# Patient Record
Sex: MALE | Race: WHITE | NOT HISPANIC OR LATINO | Employment: FULL TIME | URBAN - METROPOLITAN AREA
[De-identification: names, ages, dates, MRNs, and addresses within clinical notes are randomized per-mention and may not be internally consistent; named-entity substitution may affect disease eponyms.]

---

## 2021-07-13 ENCOUNTER — HISTORICAL (OUTPATIENT)
Dept: ADMINISTRATIVE | Facility: HOSPITAL | Age: 61
End: 2021-07-13

## 2021-12-02 ENCOUNTER — HISTORICAL (OUTPATIENT)
Dept: ADMINISTRATIVE | Facility: HOSPITAL | Age: 61
End: 2021-12-02

## 2021-12-02 LAB
ABS NEUT (OLG): 3.28 X10(3)/MCL (ref 2.1–9.2)
ALBUMIN SERPL-MCNC: 4.3 GM/DL (ref 3.4–4.8)
ALBUMIN/GLOB SERPL: 1.7 RATIO (ref 1.1–2)
ALP SERPL-CCNC: 50 UNIT/L (ref 40–150)
ALT SERPL-CCNC: 30 UNIT/L (ref 0–55)
AST SERPL-CCNC: 18 UNIT/L (ref 5–34)
BASOPHILS # BLD AUTO: 0.1 X10(3)/MCL (ref 0–0.2)
BASOPHILS NFR BLD AUTO: 1 %
BILIRUB SERPL-MCNC: 0.7 MG/DL
BILIRUBIN DIRECT+TOT PNL SERPL-MCNC: 0.2 MG/DL (ref 0–0.5)
BILIRUBIN DIRECT+TOT PNL SERPL-MCNC: 0.5 MG/DL (ref 0–0.8)
BUN SERPL-MCNC: 11.1 MG/DL (ref 8.4–25.7)
CALCIUM SERPL-MCNC: 9.2 MG/DL (ref 8.7–10.5)
CHLORIDE SERPL-SCNC: 105 MMOL/L (ref 98–107)
CHOLEST SERPL-MCNC: 245 MG/DL
CHOLEST/HDLC SERPL: 5 {RATIO} (ref 0–5)
CO2 SERPL-SCNC: 29 MMOL/L (ref 23–31)
CREAT SERPL-MCNC: 1.06 MG/DL (ref 0.73–1.18)
EOSINOPHIL # BLD AUTO: 0.2 X10(3)/MCL (ref 0–0.9)
EOSINOPHIL NFR BLD AUTO: 3 %
ERYTHROCYTE [DISTWIDTH] IN BLOOD BY AUTOMATED COUNT: 13.2 % (ref 11.5–17)
EST. AVERAGE GLUCOSE BLD GHB EST-MCNC: 125.5 MG/DL
GLOBULIN SER-MCNC: 2.5 GM/DL (ref 2.4–3.5)
GLUCOSE SERPL-MCNC: 124 MG/DL (ref 82–115)
HBA1C MFR BLD: 6 %
HCT VFR BLD AUTO: 45.8 % (ref 42–52)
HDLC SERPL-MCNC: 54 MG/DL (ref 35–60)
HGB BLD-MCNC: 15 GM/DL (ref 14–18)
LDLC SERPL CALC-MCNC: 159 MG/DL (ref 50–140)
LYMPHOCYTES # BLD AUTO: 2.1 X10(3)/MCL (ref 0.6–4.6)
LYMPHOCYTES NFR BLD AUTO: 33 %
MCH RBC QN AUTO: 30.5 PG (ref 27–31)
MCHC RBC AUTO-ENTMCNC: 32.8 GM/DL (ref 33–36)
MCV RBC AUTO: 93.1 FL (ref 80–94)
MONOCYTES # BLD AUTO: 0.7 X10(3)/MCL (ref 0.1–1.3)
MONOCYTES NFR BLD AUTO: 11 %
NEUTROPHILS # BLD AUTO: 3.28 X10(3)/MCL (ref 2.1–9.2)
NEUTROPHILS NFR BLD AUTO: 52 %
PLATELET # BLD AUTO: 134 X10(3)/MCL (ref 130–400)
PMV BLD AUTO: 11.9 FL (ref 9.4–12.4)
POTASSIUM SERPL-SCNC: 4.4 MMOL/L (ref 3.5–5.1)
PROT SERPL-MCNC: 6.8 GM/DL (ref 5.8–7.6)
PSA SERPL-MCNC: 1.35 NG/ML
RBC # BLD AUTO: 4.92 X10(6)/MCL (ref 4.7–6.1)
SODIUM SERPL-SCNC: 142 MMOL/L (ref 136–145)
TRIGL SERPL-MCNC: 158 MG/DL (ref 34–140)
TSH SERPL-ACNC: 1.46 UIU/ML (ref 0.35–4.94)
VLDLC SERPL CALC-MCNC: 32 MG/DL
WBC # SPEC AUTO: 6.3 X10(3)/MCL (ref 4.5–11.5)

## 2022-04-09 ENCOUNTER — HISTORICAL (OUTPATIENT)
Dept: ADMINISTRATIVE | Facility: HOSPITAL | Age: 62
End: 2022-04-09

## 2022-04-25 VITALS
SYSTOLIC BLOOD PRESSURE: 136 MMHG | HEIGHT: 67 IN | OXYGEN SATURATION: 98 % | DIASTOLIC BLOOD PRESSURE: 80 MMHG | WEIGHT: 227.06 LBS | BODY MASS INDEX: 35.64 KG/M2

## 2022-08-04 ENCOUNTER — TELEPHONE (OUTPATIENT)
Dept: PRIMARY CARE CLINIC | Facility: CLINIC | Age: 62
End: 2022-08-04

## 2022-08-04 NOTE — TELEPHONE ENCOUNTER
I spoke with  Benito. He is switching PCP closer to his home. Progress note and blood work results faxed to Dr. Robert Damon.

## 2022-08-04 NOTE — TELEPHONE ENCOUNTER
----- Message from Sameer Cabrera sent at 8/4/2022  3:11 PM CDT -----  Regarding: Last office note  Type:  Patient Call    Who Called: patient  Who Left Message for Patient: nurse  Does the patient know what this is regarding?: yes  Would the patient rather a call back or a response via [facility]? na  Best Call Back Number: [phone]  Additional Information:  need last office note faxed over to [phone]

## 2022-08-26 DIAGNOSIS — R50.9 FEVER, UNSPECIFIED FEVER CAUSE: Primary | ICD-10-CM

## 2022-11-29 DIAGNOSIS — Z11.59 ENCOUNTER FOR SCREENING FOR OTHER VIRAL DISEASES: ICD-10-CM

## 2022-11-29 DIAGNOSIS — Z13.6 ENCOUNTER FOR SCREENING FOR CARDIOVASCULAR DISORDERS: ICD-10-CM

## 2022-11-29 DIAGNOSIS — Z00.00 WELLNESS EXAMINATION: Primary | ICD-10-CM

## 2022-11-29 DIAGNOSIS — Z12.5 PROSTATE CANCER SCREENING: ICD-10-CM

## 2023-08-04 DIAGNOSIS — R41.3 MEMORY LOSS: Primary | ICD-10-CM

## 2023-08-17 ENCOUNTER — APPOINTMENT (OUTPATIENT)
Dept: RADIOLOGY | Facility: HOSPITAL | Age: 63
End: 2023-08-17
Attending: PSYCHIATRY & NEUROLOGY
Payer: COMMERCIAL

## 2023-08-17 DIAGNOSIS — R41.3 MEMORY LOSS: ICD-10-CM

## 2023-08-17 PROCEDURE — 70551 MRI BRAIN STEM W/O DYE: CPT | Mod: TC

## 2024-08-23 ENCOUNTER — PATIENT OUTREACH (OUTPATIENT)
Facility: CLINIC | Age: 64
End: 2024-08-23
Payer: COMMERCIAL

## 2025-01-08 ENCOUNTER — HOSPITAL ENCOUNTER (INPATIENT)
Facility: HOSPITAL | Age: 65
LOS: 7 days | Discharge: HOME HEALTH | DRG: 236 | End: 2025-01-15
Attending: THORACIC SURGERY (CARDIOTHORACIC VASCULAR SURGERY) | Admitting: THORACIC SURGERY (CARDIOTHORACIC VASCULAR SURGERY)
Payer: MEDICARE

## 2025-01-08 DIAGNOSIS — I25.10 ATHEROSCLEROSIS OF NATIVE CORONARY ARTERY OF NATIVE HEART WITHOUT ANGINA PECTORIS: Primary | ICD-10-CM

## 2025-01-08 DIAGNOSIS — I25.10 CAD (CORONARY ARTERY DISEASE): ICD-10-CM

## 2025-01-08 DIAGNOSIS — R00.0 TACHYARRHYTHMIA: ICD-10-CM

## 2025-01-08 DIAGNOSIS — I49.9 ABNORMAL HEART RHYTHM: ICD-10-CM

## 2025-01-08 DIAGNOSIS — R00.1 BRADYCARDIA: ICD-10-CM

## 2025-01-08 PROCEDURE — 11000001 HC ACUTE MED/SURG PRIVATE ROOM

## 2025-01-08 PROCEDURE — 21400001 HC TELEMETRY ROOM

## 2025-01-08 RX ORDER — PRAVASTATIN SODIUM 40 MG/1
40 TABLET ORAL NIGHTLY
COMMUNITY

## 2025-01-08 RX ORDER — AMLODIPINE BESYLATE 5 MG/1
5 TABLET ORAL DAILY
Status: ON HOLD | COMMUNITY
End: 2025-01-13 | Stop reason: HOSPADM

## 2025-01-09 ENCOUNTER — ANESTHESIA EVENT (OUTPATIENT)
Dept: SURGERY | Facility: HOSPITAL | Age: 65
DRG: 236 | End: 2025-01-09
Payer: MEDICARE

## 2025-01-09 LAB
ABORH RETYPE: NORMAL
ALBUMIN SERPL-MCNC: 4.3 G/DL (ref 3.4–4.8)
ALBUMIN/GLOB SERPL: 1.5 RATIO (ref 1.1–2)
ALP SERPL-CCNC: 43 UNIT/L (ref 40–150)
ALT SERPL-CCNC: 20 UNIT/L (ref 0–55)
ANION GAP SERPL CALC-SCNC: 9 MEQ/L
APICAL FOUR CHAMBER EJECTION FRACTION: 53 %
APICAL TWO CHAMBER EJECTION FRACTION: 59 %
APTT PPP: 29.2 SECONDS (ref 23.2–33.7)
AST SERPL-CCNC: 18 UNIT/L (ref 5–34)
AV INDEX (PROSTH): 0.54
AV MEAN GRADIENT: 9 MMHG
AV PEAK GRADIENT: 14.4 MMHG
AV VALVE AREA BY VELOCITY RATIO: 1.7 CM²
AV VALVE AREA: 1.7 CM²
AV VELOCITY RATIO: 0.53
BACTERIA #/AREA URNS AUTO: NORMAL /HPF
BASOPHILS # BLD AUTO: 0.05 X10(3)/MCL
BASOPHILS NFR BLD AUTO: 0.7 %
BILIRUB SERPL-MCNC: 0.7 MG/DL
BILIRUB UR QL STRIP.AUTO: NEGATIVE
BSA FOR ECHO PROCEDURE: 2.12 M2
BUN SERPL-MCNC: 11.6 MG/DL (ref 8.4–25.7)
CALCIUM SERPL-MCNC: 9.3 MG/DL (ref 8.8–10)
CHLORIDE SERPL-SCNC: 105 MMOL/L (ref 98–107)
CLARITY UR: CLEAR
CO2 SERPL-SCNC: 24 MMOL/L (ref 23–31)
COLOR UR AUTO: COLORLESS
CREAT SERPL-MCNC: 1.14 MG/DL (ref 0.72–1.25)
CREAT/UREA NIT SERPL: 10
CV ECHO LV RWT: 0.47 CM
DOP CALC AO PEAK VEL: 1.9 M/S
DOP CALC AO VTI: 41.1 CM
DOP CALC LVOT AREA: 3.1 CM2
DOP CALC LVOT DIAMETER: 2 CM
DOP CALC LVOT PEAK VEL: 1 M/S
DOP CALC LVOT STROKE VOLUME: 69.4 CM3
DOP CALC MV VTI: 38.5 CM
DOP CALCLVOT PEAK VEL VTI: 22.1 CM
E WAVE DECELERATION TIME: 219 MSEC
E/A RATIO: 0.99
E/E' RATIO: 8.18 M/S
ECHO LV POSTERIOR WALL: 1.2 CM (ref 0.6–1.1)
EOSINOPHIL # BLD AUTO: 0.14 X10(3)/MCL (ref 0–0.9)
EOSINOPHIL NFR BLD AUTO: 1.9 %
ERYTHROCYTE [DISTWIDTH] IN BLOOD BY AUTOMATED COUNT: 12.9 % (ref 11.5–17)
FRACTIONAL SHORTENING: 27.5 % (ref 28–44)
GFR SERPLBLD CREATININE-BSD FMLA CKD-EPI: >60 ML/MIN/1.73/M2
GLOBULIN SER-MCNC: 2.8 GM/DL (ref 2.4–3.5)
GLUCOSE SERPL-MCNC: 173 MG/DL (ref 82–115)
GLUCOSE UR QL STRIP: NORMAL
GROUP & RH: NORMAL
HCT VFR BLD AUTO: 44.5 % (ref 42–52)
HGB BLD-MCNC: 15.1 G/DL (ref 14–18)
HGB UR QL STRIP: NEGATIVE
HR MV ECHO: 57 BPM
IMM GRANULOCYTES # BLD AUTO: 0.02 X10(3)/MCL (ref 0–0.04)
IMM GRANULOCYTES NFR BLD AUTO: 0.3 %
INDIRECT COOMBS: NORMAL
INR PPP: 1
INTERVENTRICULAR SEPTUM: 1.3 CM (ref 0.6–1.1)
KETONES UR QL STRIP: NEGATIVE
LEFT ATRIUM AREA SYSTOLIC (APICAL 2 CHAMBER): 16.9 CM2
LEFT ATRIUM AREA SYSTOLIC (APICAL 4 CHAMBER): 14.9 CM2
LEFT ATRIUM SIZE: 3.7 CM
LEFT ATRIUM VOLUME INDEX MOD: 20.3 ML/M2
LEFT ATRIUM VOLUME MOD: 42 ML
LEFT INTERNAL DIMENSION IN SYSTOLE: 3.7 CM (ref 2.1–4)
LEFT VENTRICLE DIASTOLIC VOLUME INDEX: 59.42 ML/M2
LEFT VENTRICLE DIASTOLIC VOLUME: 123 ML
LEFT VENTRICLE END DIASTOLIC VOLUME APICAL 2 CHAMBER: 130 ML
LEFT VENTRICLE END DIASTOLIC VOLUME APICAL 4 CHAMBER: 180 ML
LEFT VENTRICLE END SYSTOLIC VOLUME APICAL 2 CHAMBER: 47.2 ML
LEFT VENTRICLE END SYSTOLIC VOLUME APICAL 4 CHAMBER: 37.5 ML
LEFT VENTRICLE MASS INDEX: 123.4 G/M2
LEFT VENTRICLE SYSTOLIC VOLUME INDEX: 27.3 ML/M2
LEFT VENTRICLE SYSTOLIC VOLUME: 56.6 ML
LEFT VENTRICULAR INTERNAL DIMENSION IN DIASTOLE: 5.1 CM (ref 3.5–6)
LEFT VENTRICULAR MASS: 255.5 G
LEUKOCYTE ESTERASE UR QL STRIP: NEGATIVE
LV LATERAL E/E' RATIO: 7.5 M/S
LV SEPTAL E/E' RATIO: 9 M/S
LVED V (TEICH): 123 ML
LVES V (TEICH): 56.6 ML
LVOT MG: 2 MMHG
LVOT MV: 0.68 CM/S
LYMPHOCYTES # BLD AUTO: 2.12 X10(3)/MCL (ref 0.6–4.6)
LYMPHOCYTES NFR BLD AUTO: 28.6 %
MAGNESIUM SERPL-MCNC: 2.2 MG/DL (ref 1.6–2.6)
MCH RBC QN AUTO: 30.6 PG (ref 27–31)
MCHC RBC AUTO-ENTMCNC: 33.9 G/DL (ref 33–36)
MCV RBC AUTO: 90.1 FL (ref 80–94)
MONOCYTES # BLD AUTO: 0.59 X10(3)/MCL (ref 0.1–1.3)
MONOCYTES NFR BLD AUTO: 8 %
MRSA PCR SCRN (OHS): NOT DETECTED
MV MEAN GRADIENT: 2 MMHG
MV PEAK A VEL: 0.91 M/S
MV PEAK E VEL: 0.9 M/S
MV PEAK GRADIENT: 4 MMHG
MV STENOSIS PRESSURE HALF TIME: 52 MS
MV VALVE AREA BY CONTINUITY EQUATION: 1.8 CM2
MV VALVE AREA P 1/2 METHOD: 4.23 CM2
NEUTROPHILS # BLD AUTO: 4.49 X10(3)/MCL (ref 2.1–9.2)
NEUTROPHILS NFR BLD AUTO: 60.5 %
NITRITE UR QL STRIP: NEGATIVE
NRBC BLD AUTO-RTO: 0 %
OHS LV EJECTION FRACTION SIMPSONS BIPLANE MOD: 52 %
OHS QRS DURATION: 80 MS
OHS QTC CALCULATION: 402 MS
PH UR STRIP: 6 [PH]
PLATELET # BLD AUTO: 121 X10(3)/MCL (ref 130–400)
PLATELETS.RETICULATED NFR BLD AUTO: 6.4 % (ref 0.9–11.2)
PMV BLD AUTO: 11.1 FL (ref 7.4–10.4)
POTASSIUM SERPL-SCNC: 4.9 MMOL/L (ref 3.5–5.1)
PROT SERPL-MCNC: 7.1 GM/DL (ref 5.8–7.6)
PROT UR QL STRIP: NEGATIVE
PROTHROMBIN TIME: 13.3 SECONDS (ref 12.5–14.5)
PV PEAK GRADIENT: 6 MMHG
PV PEAK VELOCITY: 1.21 M/S
RA PRESSURE ESTIMATED: 3 MMHG
RBC # BLD AUTO: 4.94 X10(6)/MCL (ref 4.7–6.1)
RBC #/AREA URNS AUTO: NORMAL /HPF
SODIUM SERPL-SCNC: 138 MMOL/L (ref 136–145)
SP GR UR STRIP.AUTO: 1.01 (ref 1–1.03)
SPECIMEN OUTDATE: NORMAL
SQUAMOUS #/AREA URNS LPF: NORMAL /HPF
TDI LATERAL: 0.12 M/S
TDI SEPTAL: 0.1 M/S
TDI: 0.11 M/S
TRICUSPID ANNULAR PLANE SYSTOLIC EXCURSION: 3.14 CM
UROBILINOGEN UR STRIP-ACNC: NORMAL
WBC # BLD AUTO: 7.41 X10(3)/MCL (ref 4.5–11.5)
WBC #/AREA URNS AUTO: NORMAL /HPF
Z-SCORE OF LEFT VENTRICULAR DIMENSION IN END DIASTOLE: -2.1
Z-SCORE OF LEFT VENTRICULAR DIMENSION IN END SYSTOLE: -0.32

## 2025-01-09 PROCEDURE — 25000003 PHARM REV CODE 250

## 2025-01-09 PROCEDURE — 25500020 PHARM REV CODE 255

## 2025-01-09 PROCEDURE — 93010 ELECTROCARDIOGRAM REPORT: CPT | Mod: ,,, | Performed by: STUDENT IN AN ORGANIZED HEALTH CARE EDUCATION/TRAINING PROGRAM

## 2025-01-09 PROCEDURE — 85730 THROMBOPLASTIN TIME PARTIAL: CPT | Performed by: THORACIC SURGERY (CARDIOTHORACIC VASCULAR SURGERY)

## 2025-01-09 PROCEDURE — 25000003 PHARM REV CODE 250: Performed by: THORACIC SURGERY (CARDIOTHORACIC VASCULAR SURGERY)

## 2025-01-09 PROCEDURE — 81001 URINALYSIS AUTO W/SCOPE: CPT | Performed by: THORACIC SURGERY (CARDIOTHORACIC VASCULAR SURGERY)

## 2025-01-09 PROCEDURE — 21400001 HC TELEMETRY ROOM

## 2025-01-09 PROCEDURE — 85610 PROTHROMBIN TIME: CPT | Performed by: THORACIC SURGERY (CARDIOTHORACIC VASCULAR SURGERY)

## 2025-01-09 PROCEDURE — 93005 ELECTROCARDIOGRAM TRACING: CPT

## 2025-01-09 PROCEDURE — 36415 COLL VENOUS BLD VENIPUNCTURE: CPT | Performed by: THORACIC SURGERY (CARDIOTHORACIC VASCULAR SURGERY)

## 2025-01-09 PROCEDURE — 86850 RBC ANTIBODY SCREEN: CPT | Performed by: PHYSICIAN ASSISTANT

## 2025-01-09 PROCEDURE — 36415 COLL VENOUS BLD VENIPUNCTURE: CPT

## 2025-01-09 PROCEDURE — 83735 ASSAY OF MAGNESIUM: CPT

## 2025-01-09 PROCEDURE — 80053 COMPREHEN METABOLIC PANEL: CPT

## 2025-01-09 PROCEDURE — 87641 MR-STAPH DNA AMP PROBE: CPT | Performed by: THORACIC SURGERY (CARDIOTHORACIC VASCULAR SURGERY)

## 2025-01-09 PROCEDURE — 85025 COMPLETE CBC W/AUTO DIFF WBC: CPT

## 2025-01-09 RX ORDER — VANCOMYCIN HCL IN 5 % DEXTROSE 1G/250ML
1000 PLASTIC BAG, INJECTION (ML) INTRAVENOUS
Status: DISCONTINUED | OUTPATIENT
Start: 2025-01-09 | End: 2025-01-10 | Stop reason: SDUPTHER

## 2025-01-09 RX ORDER — ASPIRIN 81 MG/1
81 TABLET ORAL DAILY
Status: DISCONTINUED | OUTPATIENT
Start: 2025-01-09 | End: 2025-01-10 | Stop reason: SDUPTHER

## 2025-01-09 RX ORDER — MUPIROCIN 20 MG/G
OINTMENT TOPICAL
Status: DISCONTINUED | OUTPATIENT
Start: 2025-01-09 | End: 2025-01-10 | Stop reason: SDUPTHER

## 2025-01-09 RX ORDER — ATORVASTATIN CALCIUM 40 MG/1
40 TABLET, FILM COATED ORAL DAILY
Status: DISCONTINUED | OUTPATIENT
Start: 2025-01-09 | End: 2025-01-13

## 2025-01-09 RX ORDER — AMLODIPINE BESYLATE 5 MG/1
5 TABLET ORAL DAILY
Status: DISCONTINUED | OUTPATIENT
Start: 2025-01-09 | End: 2025-01-15 | Stop reason: HOSPADM

## 2025-01-09 RX ORDER — MUPIROCIN 20 MG/G
OINTMENT TOPICAL 2 TIMES DAILY
Status: DISCONTINUED | OUTPATIENT
Start: 2025-01-09 | End: 2025-01-10 | Stop reason: SDUPTHER

## 2025-01-09 RX ORDER — LISINOPRIL 20 MG/1
40 TABLET ORAL DAILY
Status: DISCONTINUED | OUTPATIENT
Start: 2025-01-09 | End: 2025-01-15 | Stop reason: HOSPADM

## 2025-01-09 RX ADMIN — MUPIROCIN: 20 OINTMENT TOPICAL at 10:01

## 2025-01-09 RX ADMIN — MUPIROCIN: 20 OINTMENT TOPICAL at 07:01

## 2025-01-09 RX ADMIN — ASPIRIN 81 MG: 81 TABLET, COATED ORAL at 10:01

## 2025-01-09 RX ADMIN — PERFLUTREN 1.3 ML: 6.52 INJECTION, SUSPENSION INTRAVENOUS at 12:01

## 2025-01-09 RX ADMIN — LISINOPRIL 40 MG: 20 TABLET ORAL at 10:01

## 2025-01-09 RX ADMIN — AMLODIPINE BESYLATE 5 MG: 5 TABLET ORAL at 10:01

## 2025-01-09 NOTE — CONSULTS
Inpatient consult to Cardiology  Consult performed by: Kayli Gasca FNP  Consult ordered by: Ammy Nichols FNP  Reason for consult: CAD          [facility] *    Cardiology  Consult Note    Patient Name: Benito Montague III  MRN: [id number]  Admission Date: 1/8/2025  Hospital Length of Stay: 1 days  Code Status: No Order   Attending Provider: Al Hallman MD   Consulting Provider: MARY Prince  Primary Care Physician: Lew Sullivan MD  Principal Problem:<principal problem not specified>    Patient information was obtained from patient, past medical records, ER records, and primary team.     Subjective:     Chief Complaint/Reason for Consult: CAD    HPI: Mr. Montague is a 65 y/o male with a history of CAD, HTN, DM II, HLD, BRENNAN, who is known to [facility], Dr. Painting. He presented to the [facility] for a scheduled LHC after he was found to have an abnormal EKG and Stress Test. He underwent a LHC on 1.8.24 which revealed Multi-vessel CAD. He was then transferred to [facility] for a CABG Evaluation. No labs available to review. [facility] has been consulted for CAD management.     PMH: CAD, HTN, DM II, HLD, BRENNAN, Essential Tremor, Obesity  PSH: Colonoscopy, ACDF    Family History: Father - HTN, MI, CAD, HLD; Mother - HTN, MI, CAD, HLD, DM II; Sister - Cancer   Social History: Reports alcohol use. Denies illicit drug use and smoking.     Previous Cardiac Diagnostics:   LHC (1.8.25):  RCA:  of the mid RCA. The distal vessel fills via collaterals from the left. Left main: Severe distal LM disease 60-70% eccentric, calcified lesion. LCX: gives rise to 2 small and 2 large OM branches. Of the 2 larger branches the upper has 40-50% stenosis while the lower has a 60-70% stenosis. LAD: mild disease. Gives rise to 2 large diagonal branches.     LVEDP: 15 mmHg  Ao-LV Gradient: 0 mmHg    MPI (1.3.25):  This is an abnormal perfusion study. This scan is suggestive of moderate risk for future cardiovascular events.  Medium reversible perfusion abnormality of moderate intensity in the inferior septal region. Medium reversible perfusion abnormality of moderate intensity in the inferior region. Medium reversible perfusion abnormality of moderate intensity in the inferior lateral region. The left ventricular cavity is noted to be normal on the stress study. The left ventricular ejection fraction was calculated to be 51% and left ventricular global function is normal. The study quality is good.     Review of patient's allergies indicates:   Allergen Reactions    Penicillin Hives    Tuberculin, purified protein derivative Rash     No current facility-administered medications on file prior to encounter.     Current Outpatient Medications on File Prior to Encounter   Medication Sig    amLODIPine (NORVASC) 5 MG tablet Take 5 mg by mouth once daily.    cyanocobalamin, vitamin B-12, (VITAMIN B-12 ORAL)   0 Refill(s)    glucosamine HCl/chondroitin quinn (GLUCOSAMINE-CHONDROITIN ORAL) Take by mouth.    lisinopriL 10 MG tablet Take 40 mg by mouth once daily.    montelukast (SINGULAIR) 10 mg tablet Take 10 mg by mouth.    pravastatin (PRAVACHOL) 40 MG tablet Take 40 mg by mouth every evening.    propranoloL (INDERAL) 10 MG tablet Take 10 mg by mouth.    RED YEAST RICE ORAL Take 1,200 mg by mouth.     Review of Systems   Constitutional:  Negative for fatigue.   Respiratory:  Negative for chest tightness and shortness of breath.    Cardiovascular:  Negative for chest pain, palpitations and leg swelling.   All other systems reviewed and are negative.      Objective:     Vital Signs (Most Recent):  Temp: 98.1 °F (36.7 °C) (01/09/25 0725)  Pulse: (!) 52 (01/09/25 0725)  Resp: 18 (01/09/25 0407)  BP: 138/68 (01/09/25 0725)  SpO2: 98 % (01/09/25 0725) Vital Signs (24h Range):  Temp:  [97.6 °F (36.4 °C)-98.8 °F (37.1 °C)] 98.1 °F (36.7 °C)  Pulse:  [45-80] 52  Resp:  [18] 18  SpO2:  [96 %-100 %] 98 %  BP: (123-138)/(63-76) 138/68   Weight: 95.5 kg (210  "lb 8.6 oz)  Body mass index is 32.98 kg/m².  SpO2: 98 %       Intake/Output Summary (Last 24 hours) at 1/9/2025 0823  Last data filed at 1/9/2025 0516  Gross per 24 hour   Intake 240 ml   Output --   Net 240 ml     Lines/Drains/Airways       None                 Significant Labs:   Chemistries:   No results for input(s): "NA", "K", "CL", "CO2", "BUN", "CREATININE", "CALCIUM", "PROT", "BILITOT", "ALKPHOS", "ALT", "AST", "GLUCOSE", "MG", "PHOS", "TROPONINI" in the last 168 hours.    Invalid input(s): "LABALBU"     CBC/Anemia Labs: Coags:    No results for input(s): "WBC", "HGB", "HCT", "PLT", "MCV", "RDW", "IRON", "FERRITIN", "RETIC", "FOLATE", "[id number]", "OCCULTBLOOD" in the last 168 hours.    Invalid input(s): "IRONSATURATED" No results for input(s): "PT", "INR", "APTT" in the last 168 hours.     Significant Imaging:    EKG:       Telemetry:  SR    Physical Exam  Vitals and nursing note reviewed.   HENT:      Head: Normocephalic.      Nose: Nose normal.      Mouth/Throat:      Mouth: Mucous membranes are dry.   Eyes:      Extraocular Movements: Extraocular movements intact.   Cardiovascular:      Rate and Rhythm: Normal rate and regular rhythm.      Pulses: Normal pulses.      Heart sounds: Normal heart sounds.   Pulmonary:      Effort: Pulmonary effort is normal.      Breath sounds: Normal breath sounds.   Abdominal:      Palpations: Abdomen is soft.   Musculoskeletal:         General: Normal range of motion.      Cervical back: Normal range of motion.   Skin:     General: Skin is warm.      Comments: R Wrist Soft/Flat, Non-Tender, No Sign of Bleed/Infection. +2 BLE Palpable Radial Pulses     Neurological:      Mental Status: He is alert and oriented to person, place, and time.   Psychiatric:         Mood and Affect: Mood normal.         Behavior: Behavior normal.       Home Medications:   No current facility-administered medications on file prior to encounter.     Current Outpatient Medications on File Prior to " Encounter   Medication Sig Dispense Refill    amLODIPine (NORVASC) 5 MG tablet Take 5 mg by mouth once daily.      cyanocobalamin, vitamin B-12, (VITAMIN B-12 ORAL)   0 Refill(s)      glucosamine HCl/chondroitin quinn (GLUCOSAMINE-CHONDROITIN ORAL) Take by mouth.      lisinopriL 10 MG tablet Take 40 mg by mouth once daily.      montelukast (SINGULAIR) 10 mg tablet Take 10 mg by mouth.      pravastatin (PRAVACHOL) 40 MG tablet Take 40 mg by mouth every evening.      propranoloL (INDERAL) 10 MG tablet Take 10 mg by mouth.      RED YEAST RICE ORAL Take 1,200 mg by mouth.       Current Schedule Inpatient Medications:    Continuous Infusions:    Assessment:   CAD    - [facility] (1.8.25): severe multivessel CAD including distal left main and  of the RCA  Bradycardia  HTN  DM II  HLD  BRENNAN  Essential Tremor  Obesity  No known history of GI Bleed     Plan:   ECHO and Carotid US Today   Obtain EKG  Obtain Labs  Await CV Surgery Recommendations   No BB secondary to Bradycardia  Resume Home Medications   Keep Mag > 2 and Potassium > 4  Labs in AM: CBC, BMP, and Mag     Thank you for your consult.     MARY Prince  Cardiology  [facility]    I agree with the findings of the complexity of problems addressed and take responsibility for the plan's risks and complications. I approved the plan documented by Kayli Gasca NP.

## 2025-01-09 NOTE — H&P
"Benito Montague III is a 64 y.o. male patient.   1. CAD (coronary artery disease)    2. Bradycardia      Past Medical History:   Diagnosis Date    Essential (primary) hypertension     Essential tremor     Seasonal allergies     Sleep apnea, unspecified      No past surgical history pertinent negatives on file.  Scheduled Meds:   amLODIPine  5 mg Oral Daily    aspirin  81 mg Oral Daily    atorvastatin  40 mg Oral Daily    lisinopriL  40 mg Oral Daily    mupirocin   Nasal BID     Continuous Infusions:  PRN Meds:    Review of patient's allergies indicates:   Allergen Reactions    Penicillin Hives    Tuberculin, purified protein derivative Rash     There are no hospital problems to display for this patient.    Blood pressure 129/67, pulse (!) 54, temperature 98.2 °F (36.8 °C), temperature source Oral, resp. rate 18, height 5' 7" (1.702 m), weight 95.5 kg (210 lb 8.6 oz), SpO2 97%.    Subjective:    HPI: Mr. Montague is a 63yo male with PMH of CAD, HTN, T2DM, HLD, who presented to [facility] for an elective LHC after he had an abnormal EKG and stress test.  He underwent a LHC on 1/8/25, revealing severe multivessel CAD. He has been transferred to [facility] for CABG evaluation.     Objective:   Awake. Alert.  Sitting up in bed  AFVSS  Heart: NSR  Lungs: respirations nonlabored, clear  Abd: soft, ntnd  Neuro: nonfocal. MAEW  Skin: warm  Labs: reviewed  LHC: reviewed with Dr. Hallman    Asshectorment/Plan:    Severe multivessel CAD  HTN  T2DM  HLD    He is a candidate for CABG  Timing recs to follow      BK Mccarthy  1/9/2025        HPI: Mr. Montague is a 63yo male with PMH of CAD, HTN, T2DM, HLD, who presented to [facility] for an elective LHC after he had an abnormal EKG and stress test.  He underwent a LHC on 1/8/25, revealing severe multivessel CAD. He has been transferred to [facility] for CABG evaluation.     "

## 2025-01-09 NOTE — PLAN OF CARE
01/09/25 0914   Discharge Assessment   Assessment Type Discharge Planning Assessment   Confirmed/corrected address, phone number and insurance Yes   Confirmed Demographics Correct on Facesheet   Source of Information patient;family  (wife at bs)   When was your last doctors appointment? 11/22/24  (Dr Robert jett)   Does patient/caregiver understand observation status Yes   Communicated PRECIOUS with patient/caregiver Yes   Reason For Admission cad   People in Home spouse   Facility Arrived From: home   Do you expect to return to your current living situation? Yes   Do you have help at home or someone to help you manage your care at home? Yes   Who are your caregiver(s) and their phone number(s)? fmly   Current cognitive status: Alert/Oriented   Walking or Climbing Stairs Difficulty no   Dressing/Bathing Difficulty no   Equipment Currently Used at Home BIPAP   Readmission within 30 days? No   Patient currently being followed by outpatient case management? No   Do you currently have service(s) that help you manage your care at home? No   Do you take prescription medications? Yes   Do you have any problems affording any of your prescribed medications? No   Is the patient taking medications as prescribed? yes   Who is going to help you get home at discharge? fmly   How do you get to doctors appointments? car, drives self   Are you on dialysis? No   Do you take coumadin? No   Discharge Plan A Home Health   Discharge Plan B Home Health   DME Needed Upon Discharge  other (see comments)  (tbd)   Transition of Care Barriers None   Housing Stability   In the last 12 months, was there a time when you were not able to pay the mortgage or rent on time? N   At any time in the past 12 months, were you homeless or living in a shelter (including now)? N   Transportation Needs   Has the lack of transportation kept you from medical appointments, meetings, work or from getting things needed for daily living? No   Food  Insecurity   Within the past 12 months, you worried that your food would run out before you got the money to buy more. Never true   Within the past 12 months, the food you bought just didn't last and you didn't have money to get more. Never true   Alcohol Use   Q2: How many drinks containing alcohol do you have on a typical day when you are drinking? None   Utilities   In the past 12 months has the electric, gas, oil, or water company threatened to shut off services in your home? No     Pt and spouse live together. He is independent, recently retired. He has BIPAP. Needs TBD.

## 2025-01-10 ENCOUNTER — ANESTHESIA (OUTPATIENT)
Dept: SURGERY | Facility: HOSPITAL | Age: 65
DRG: 236 | End: 2025-01-10
Payer: MEDICARE

## 2025-01-10 LAB
ABO + RH BLD: NORMAL
ABO + RH BLD: NORMAL
ALBUMIN SERPL-MCNC: 4.1 G/DL (ref 3.4–4.8)
ALBUMIN/GLOB SERPL: 1.9 RATIO (ref 1.1–2)
ALLENS TEST BLOOD GAS (OHS): ABNORMAL
ALLENS TEST BLOOD GAS (OHS): ABNORMAL
ALP SERPL-CCNC: 41 UNIT/L (ref 40–150)
ALT SERPL-CCNC: 18 UNIT/L (ref 0–55)
ANION GAP SERPL CALC-SCNC: 6 MEQ/L
ANION GAP SERPL CALC-SCNC: 6 MEQ/L
ANION GAP SERPL CALC-SCNC: 8 MEQ/L
APTT PPP: 30.7 SECONDS (ref 23.2–33.7)
AST SERPL-CCNC: 12 UNIT/L (ref 5–34)
BASE EXCESS BLD CALC-SCNC: -5 MMOL/L (ref -2–2)
BASE EXCESS BLD CALC-SCNC: 2 MMOL/L (ref -2–2)
BASOPHILS # BLD AUTO: 0.03 X10(3)/MCL
BASOPHILS # BLD AUTO: 0.06 X10(3)/MCL
BASOPHILS # BLD AUTO: 0.06 X10(3)/MCL
BASOPHILS NFR BLD AUTO: 0.3 %
BASOPHILS NFR BLD AUTO: 0.5 %
BASOPHILS NFR BLD AUTO: 0.9 %
BILIRUB SERPL-MCNC: 0.6 MG/DL
BLD PROD TYP BPU: NORMAL
BLD PROD TYP BPU: NORMAL
BLOOD GAS SAMPLE TYPE (OHS): ABNORMAL
BLOOD GAS SAMPLE TYPE (OHS): ABNORMAL
BLOOD UNIT EXPIRATION DATE: NORMAL
BLOOD UNIT EXPIRATION DATE: NORMAL
BLOOD UNIT TYPE CODE: 6200
BLOOD UNIT TYPE CODE: 6200
BUN SERPL-MCNC: 10.1 MG/DL (ref 8.4–25.7)
BUN SERPL-MCNC: 12.8 MG/DL (ref 8.4–25.7)
BUN SERPL-MCNC: 9.9 MG/DL (ref 8.4–25.7)
CA-I BLD-SCNC: 1.16 MMOL/L (ref 1.12–1.23)
CA-I BLD-SCNC: 1.29 MMOL/L (ref 1.12–1.23)
CALCIUM SERPL-MCNC: 10.1 MG/DL (ref 8.8–10)
CALCIUM SERPL-MCNC: 8.6 MG/DL (ref 8.8–10)
CALCIUM SERPL-MCNC: 8.8 MG/DL (ref 8.8–10)
CHLORIDE SERPL-SCNC: 107 MMOL/L (ref 98–107)
CHLORIDE SERPL-SCNC: 109 MMOL/L (ref 98–107)
CHLORIDE SERPL-SCNC: 110 MMOL/L (ref 98–107)
CO2 BLDA-SCNC: 23.9 MMOL/L
CO2 BLDA-SCNC: 28.7 MMOL/L
CO2 SERPL-SCNC: 21 MMOL/L (ref 23–31)
CO2 SERPL-SCNC: 22 MMOL/L (ref 23–31)
CO2 SERPL-SCNC: 26 MMOL/L (ref 23–31)
COHGB MFR BLDA: 1.6 % (ref 0.5–1.5)
COHGB MFR BLDA: 2.3 % (ref 0.5–1.5)
CREAT SERPL-MCNC: 0.83 MG/DL (ref 0.72–1.25)
CREAT SERPL-MCNC: 0.9 MG/DL (ref 0.72–1.25)
CREAT SERPL-MCNC: 0.97 MG/DL (ref 0.72–1.25)
CREAT/UREA NIT SERPL: 11
CREAT/UREA NIT SERPL: 12
CREAT/UREA NIT SERPL: 13
CROSSMATCH INTERPRETATION: NORMAL
CROSSMATCH INTERPRETATION: NORMAL
DISPENSE STATUS: NORMAL
DISPENSE STATUS: NORMAL
DRAWN BY BLOOD GAS (OHS): ABNORMAL
DRAWN BY BLOOD GAS (OHS): ABNORMAL
EOSINOPHIL # BLD AUTO: 0.07 X10(3)/MCL (ref 0–0.9)
EOSINOPHIL # BLD AUTO: 0.18 X10(3)/MCL (ref 0–0.9)
EOSINOPHIL # BLD AUTO: 0.22 X10(3)/MCL (ref 0–0.9)
EOSINOPHIL NFR BLD AUTO: 0.6 %
EOSINOPHIL NFR BLD AUTO: 1.8 %
EOSINOPHIL NFR BLD AUTO: 2.8 %
ERYTHROCYTE [DISTWIDTH] IN BLOOD BY AUTOMATED COUNT: 12.8 % (ref 11.5–17)
FIO2: 100
FIO2: 70
FIO2: 70
FIO2: 80
GFR SERPLBLD CREATININE-BSD FMLA CKD-EPI: >60 ML/MIN/1.73/M2
GLOBULIN SER-MCNC: 2.2 GM/DL (ref 2.4–3.5)
GLUCOSE SERPL-MCNC: 121 MG/DL (ref 70–110)
GLUCOSE SERPL-MCNC: 122 MG/DL (ref 70–110)
GLUCOSE SERPL-MCNC: 122 MG/DL (ref 70–110)
GLUCOSE SERPL-MCNC: 128 MG/DL (ref 70–110)
GLUCOSE SERPL-MCNC: 129 MG/DL (ref 70–110)
GLUCOSE SERPL-MCNC: 137 MG/DL (ref 70–110)
GLUCOSE SERPL-MCNC: 163 MG/DL (ref 82–115)
GLUCOSE SERPL-MCNC: 205 MG/DL (ref 82–115)
GLUCOSE SERPL-MCNC: 97 MG/DL (ref 82–115)
HCO3 BLDA-SCNC: 22.4 MMOL/L (ref 22–26)
HCO3 BLDA-SCNC: 27.3 MMOL/L (ref 22–26)
HCO3 UR-SCNC: 21.6 MMOL/L (ref 24–28)
HCO3 UR-SCNC: 21.7 MMOL/L (ref 24–28)
HCO3 UR-SCNC: 21.9 MMOL/L (ref 24–28)
HCO3 UR-SCNC: 22 MMOL/L (ref 24–28)
HCO3 UR-SCNC: 23.8 MMOL/L (ref 24–28)
HCO3 UR-SCNC: 24.1 MMOL/L (ref 24–28)
HCT VFR BLD AUTO: 29.7 % (ref 42–52)
HCT VFR BLD AUTO: 34 % (ref 42–52)
HCT VFR BLD AUTO: 43 % (ref 42–52)
HCT VFR BLD CALC: 30 %PCV (ref 36–54)
HCT VFR BLD CALC: 31 %PCV (ref 36–54)
HCT VFR BLD CALC: 31 %PCV (ref 36–54)
HCT VFR BLD CALC: 32 %PCV (ref 36–54)
HCT VFR BLD CALC: 40 %PCV (ref 36–54)
HCT VFR BLD CALC: 40 %PCV (ref 36–54)
HGB BLD-MCNC: 10 G/DL
HGB BLD-MCNC: 10.1 G/DL (ref 14–18)
HGB BLD-MCNC: 11 G/DL
HGB BLD-MCNC: 11.6 G/DL (ref 14–18)
HGB BLD-MCNC: 14 G/DL
HGB BLD-MCNC: 14 G/DL
HGB BLD-MCNC: 14.5 G/DL (ref 14–18)
IMM GRANULOCYTES # BLD AUTO: 0.01 X10(3)/MCL (ref 0–0.04)
IMM GRANULOCYTES # BLD AUTO: 0.06 X10(3)/MCL (ref 0–0.04)
IMM GRANULOCYTES # BLD AUTO: 0.08 X10(3)/MCL (ref 0–0.04)
IMM GRANULOCYTES NFR BLD AUTO: 0.2 %
IMM GRANULOCYTES NFR BLD AUTO: 0.5 %
IMM GRANULOCYTES NFR BLD AUTO: 0.6 %
INHALED O2 CONCENTRATION: 40 %
INR PPP: 1.5
LPM (OHS): 2
LYMPHOCYTES # BLD AUTO: 1.98 X10(3)/MCL (ref 0.6–4.6)
LYMPHOCYTES # BLD AUTO: 2.5 X10(3)/MCL (ref 0.6–4.6)
LYMPHOCYTES # BLD AUTO: 2.81 X10(3)/MCL (ref 0.6–4.6)
LYMPHOCYTES NFR BLD AUTO: 17.2 %
LYMPHOCYTES NFR BLD AUTO: 22.7 %
LYMPHOCYTES NFR BLD AUTO: 38.4 %
MAGNESIUM SERPL-MCNC: 2.3 MG/DL (ref 1.6–2.6)
MCH RBC QN AUTO: 30.1 PG (ref 27–31)
MCH RBC QN AUTO: 31 PG (ref 27–31)
MCH RBC QN AUTO: 31.3 PG (ref 27–31)
MCHC RBC AUTO-ENTMCNC: 33.7 G/DL (ref 33–36)
MCHC RBC AUTO-ENTMCNC: 34 G/DL (ref 33–36)
MCHC RBC AUTO-ENTMCNC: 34.1 G/DL (ref 33–36)
MCV RBC AUTO: 89.4 FL (ref 80–94)
MCV RBC AUTO: 91.1 FL (ref 80–94)
MCV RBC AUTO: 91.6 FL (ref 80–94)
MECH RR (OHS): 8 B/MIN
METHGB MFR BLDA: 0.4 % (ref 0.4–1.5)
METHGB MFR BLDA: 0.8 % (ref 0.4–1.5)
MODE (OHS): ABNORMAL
MONOCYTES # BLD AUTO: 0.61 X10(3)/MCL (ref 0.1–1.3)
MONOCYTES # BLD AUTO: 0.69 X10(3)/MCL (ref 0.1–1.3)
MONOCYTES # BLD AUTO: 0.76 X10(3)/MCL (ref 0.1–1.3)
MONOCYTES NFR BLD AUTO: 11.7 %
MONOCYTES NFR BLD AUTO: 5.3 %
MONOCYTES NFR BLD AUTO: 5.6 %
NEUTROPHILS # BLD AUTO: 3 X10(3)/MCL (ref 2.1–9.2)
NEUTROPHILS # BLD AUTO: 8.51 X10(3)/MCL (ref 2.1–9.2)
NEUTROPHILS # BLD AUTO: 8.75 X10(3)/MCL (ref 2.1–9.2)
NEUTROPHILS NFR BLD AUTO: 46 %
NEUTROPHILS NFR BLD AUTO: 68.8 %
NEUTROPHILS NFR BLD AUTO: 76.1 %
NRBC BLD AUTO-RTO: 0 %
O2 HB BLOOD GAS (OHS): 94.6 % (ref 94–97)
O2 HB BLOOD GAS (OHS): 97.2 % (ref 94–97)
OXYGEN DEVICE BLOOD GAS (OHS): ABNORMAL
OXYGEN DEVICE BLOOD GAS (OHS): ABNORMAL
OXYHGB MFR BLDA: 12.7 G/DL (ref 12–16)
OXYHGB MFR BLDA: 15.2 G/DL (ref 12–16)
PCO2 BLDA: 32.7 MMHG (ref 35–45)
PCO2 BLDA: 33.4 MMHG (ref 35–45)
PCO2 BLDA: 37.3 MMHG (ref 35–45)
PCO2 BLDA: 37.7 MMHG (ref 35–45)
PCO2 BLDA: 38 MMHG (ref 35–45)
PCO2 BLDA: 41.6 MMHG (ref 35–45)
PCO2 BLDA: 44 MMHG (ref 35–45)
PCO2 BLDA: 50 MMHG (ref 35–45)
PEEP RESPIRATORY: 5 CMH2O
PH BLDA: 7.26 [PH] (ref 7.35–7.45)
PH BLDA: 7.4 [PH] (ref 7.35–7.45)
PH SMN: 7.36 [PH] (ref 7.35–7.45)
PH SMN: 7.37 [PH] (ref 7.35–7.45)
PH SMN: 7.38 [PH] (ref 7.35–7.45)
PH SMN: 7.41 [PH] (ref 7.35–7.45)
PH SMN: 7.42 [PH] (ref 7.35–7.45)
PH SMN: 7.43 [PH] (ref 7.35–7.45)
PLATELET # BLD AUTO: 136 X10(3)/MCL (ref 130–400)
PLATELET # BLD AUTO: 155 X10(3)/MCL (ref 130–400)
PLATELET # BLD AUTO: 66 X10(3)/MCL (ref 130–400)
PLATELETS.RETICULATED NFR BLD AUTO: 4.5 % (ref 0.9–11.2)
PLATELETS.RETICULATED NFR BLD AUTO: 5.7 % (ref 0.9–11.2)
PMV BLD AUTO: 11.1 FL (ref 7.4–10.4)
PMV BLD AUTO: 11.3 FL (ref 7.4–10.4)
PMV BLD AUTO: 11.4 FL (ref 7.4–10.4)
PO2 BLDA: 114 MMHG (ref 80–100)
PO2 BLDA: 272 MMHG (ref 80–100)
PO2 BLDA: 273 MMHG (ref 80–100)
PO2 BLDA: 300 MMHG (ref 80–100)
PO2 BLDA: 326 MMHG (ref 80–100)
PO2 BLDA: 40 MMHG (ref 40–60)
PO2 BLDA: 53 MMHG (ref 40–60)
PO2 BLDA: 76 MMHG (ref 80–100)
POC BE: -1 MMOL/L
POC BE: -1 MMOL/L
POC BE: -2 MMOL/L
POC BE: -3 MMOL/L
POC BE: -3 MMOL/L
POC BE: -4 MMOL/L
POC IONIZED CALCIUM: 1 MMOL/L (ref 1.06–1.42)
POC IONIZED CALCIUM: 1.06 MMOL/L (ref 1.06–1.42)
POC IONIZED CALCIUM: 1.15 MMOL/L (ref 1.06–1.42)
POC IONIZED CALCIUM: 1.21 MMOL/L (ref 1.06–1.42)
POC IONIZED CALCIUM: 1.26 MMOL/L (ref 1.06–1.42)
POC IONIZED CALCIUM: 1.5 MMOL/L (ref 1.06–1.42)
POC SATURATED O2: 100 % (ref 95–100)
POC SATURATED O2: 73 % (ref 95–100)
POC SATURATED O2: 86 % (ref 95–100)
POC TCO2: 23 MMOL/L (ref 23–27)
POC TCO2: 23 MMOL/L (ref 24–29)
POC TCO2: 25 MMOL/L (ref 23–27)
POC TCO2: 25 MMOL/L (ref 24–29)
POCT GLUCOSE: 173 MG/DL (ref 70–110)
POCT GLUCOSE: 176 MG/DL (ref 70–110)
POCT GLUCOSE: 187 MG/DL (ref 70–110)
POCT GLUCOSE: <20 MG/DL (ref 70–110)
POTASSIUM BLD-SCNC: 4.1 MMOL/L (ref 3.5–5.1)
POTASSIUM BLD-SCNC: 4.2 MMOL/L (ref 3.5–5.1)
POTASSIUM BLD-SCNC: 4.3 MMOL/L (ref 3.5–5.1)
POTASSIUM BLD-SCNC: 4.6 MMOL/L (ref 3.5–5.1)
POTASSIUM BLD-SCNC: 4.6 MMOL/L (ref 3.5–5.1)
POTASSIUM BLD-SCNC: 4.7 MMOL/L (ref 3.5–5.1)
POTASSIUM BLOOD GAS (OHS): 3.9 MMOL/L (ref 3.5–5)
POTASSIUM BLOOD GAS (OHS): 4 MMOL/L (ref 3.5–5)
POTASSIUM SERPL-SCNC: 3.6 MMOL/L (ref 3.5–5.1)
POTASSIUM SERPL-SCNC: 4 MMOL/L (ref 3.5–5.1)
POTASSIUM SERPL-SCNC: 4.4 MMOL/L (ref 3.5–5.1)
PROT SERPL-MCNC: 6.3 GM/DL (ref 5.8–7.6)
PROTHROMBIN TIME: 18.3 SECONDS (ref 12.5–14.5)
PS (OHS): 10 CMH2O
RBC # BLD AUTO: 3.26 X10(6)/MCL (ref 4.7–6.1)
RBC # BLD AUTO: 3.71 X10(6)/MCL (ref 4.7–6.1)
RBC # BLD AUTO: 4.81 X10(6)/MCL (ref 4.7–6.1)
SAMPLE SITE BLOOD GAS (OHS): ABNORMAL
SAMPLE SITE BLOOD GAS (OHS): ABNORMAL
SAMPLE: ABNORMAL
SAO2 % BLDA: 92.7 %
SAO2 % BLDA: 98.5 %
SODIUM BLD-SCNC: 137 MMOL/L (ref 136–145)
SODIUM BLD-SCNC: 138 MMOL/L (ref 136–145)
SODIUM BLD-SCNC: 139 MMOL/L (ref 136–145)
SODIUM BLD-SCNC: 139 MMOL/L (ref 136–145)
SODIUM BLOOD GAS (OHS): 135 MMOL/L (ref 137–145)
SODIUM BLOOD GAS (OHS): 137 MMOL/L (ref 137–145)
SODIUM SERPL-SCNC: 138 MMOL/L (ref 136–145)
SODIUM SERPL-SCNC: 138 MMOL/L (ref 136–145)
SODIUM SERPL-SCNC: 139 MMOL/L (ref 136–145)
SPONT+MECH VT ON VENT: 500 ML
UNIT NUMBER: NORMAL
UNIT NUMBER: NORMAL
WBC # BLD AUTO: 11.5 X10(3)/MCL (ref 4.5–11.5)
WBC # BLD AUTO: 12.37 X10(3)/MCL (ref 4.5–11.5)
WBC # BLD AUTO: 6.51 X10(3)/MCL (ref 4.5–11.5)

## 2025-01-10 PROCEDURE — 85025 COMPLETE CBC W/AUTO DIFF WBC: CPT | Performed by: THORACIC SURGERY (CARDIOTHORACIC VASCULAR SURGERY)

## 2025-01-10 PROCEDURE — 33533 CABG ARTERIAL SINGLE: CPT | Mod: AS,,, | Performed by: PHYSICIAN ASSISTANT

## 2025-01-10 PROCEDURE — 06BQ4ZZ EXCISION OF LEFT SAPHENOUS VEIN, PERCUTANEOUS ENDOSCOPIC APPROACH: ICD-10-PCS | Performed by: THORACIC SURGERY (CARDIOTHORACIC VASCULAR SURGERY)

## 2025-01-10 PROCEDURE — 25000003 PHARM REV CODE 250: Performed by: NURSE ANESTHETIST, CERTIFIED REGISTERED

## 2025-01-10 PROCEDURE — 25000003 PHARM REV CODE 250: Performed by: PHYSICIAN ASSISTANT

## 2025-01-10 PROCEDURE — D9220A PRA ANESTHESIA: Mod: CRNA,,, | Performed by: NURSE ANESTHETIST, CERTIFIED REGISTERED

## 2025-01-10 PROCEDURE — C1729 CATH, DRAINAGE: HCPCS | Performed by: THORACIC SURGERY (CARDIOTHORACIC VASCULAR SURGERY)

## 2025-01-10 PROCEDURE — 33508 ENDOSCOPIC VEIN HARVEST: CPT | Mod: 59,,, | Performed by: THORACIC SURGERY (CARDIOTHORACIC VASCULAR SURGERY)

## 2025-01-10 PROCEDURE — 63600175 PHARM REV CODE 636 W HCPCS

## 2025-01-10 PROCEDURE — C1887 CATHETER, GUIDING: HCPCS | Performed by: THORACIC SURGERY (CARDIOTHORACIC VASCULAR SURGERY)

## 2025-01-10 PROCEDURE — 63600175 PHARM REV CODE 636 W HCPCS: Performed by: ANESTHESIOLOGY

## 2025-01-10 PROCEDURE — 33518 CABG ARTERY-VEIN TWO: CPT | Mod: ,,, | Performed by: THORACIC SURGERY (CARDIOTHORACIC VASCULAR SURGERY)

## 2025-01-10 PROCEDURE — 76937 US GUIDE VASCULAR ACCESS: CPT | Mod: 26,,, | Performed by: ANESTHESIOLOGY

## 2025-01-10 PROCEDURE — 27000513 HC SENSOR FLOTRAC

## 2025-01-10 PROCEDURE — 20000000 HC ICU ROOM

## 2025-01-10 PROCEDURE — 86923 COMPATIBILITY TEST ELECTRIC: CPT | Mod: 91 | Performed by: PHYSICIAN ASSISTANT

## 2025-01-10 PROCEDURE — 85730 THROMBOPLASTIN TIME PARTIAL: CPT | Performed by: THORACIC SURGERY (CARDIOTHORACIC VASCULAR SURGERY)

## 2025-01-10 PROCEDURE — 021109W BYPASS CORONARY ARTERY, TWO ARTERIES FROM AORTA WITH AUTOLOGOUS VENOUS TISSUE, OPEN APPROACH: ICD-10-PCS | Performed by: THORACIC SURGERY (CARDIOTHORACIC VASCULAR SURGERY)

## 2025-01-10 PROCEDURE — 82803 BLOOD GASES ANY COMBINATION: CPT

## 2025-01-10 PROCEDURE — 85025 COMPLETE CBC W/AUTO DIFF WBC: CPT

## 2025-01-10 PROCEDURE — 02L70ZK OCCLUSION OF LEFT ATRIAL APPENDAGE, OPEN APPROACH: ICD-10-PCS | Performed by: THORACIC SURGERY (CARDIOTHORACIC VASCULAR SURGERY)

## 2025-01-10 PROCEDURE — S5010 5% DEXTROSE AND 0.45% SALINE: HCPCS | Performed by: PHYSICIAN ASSISTANT

## 2025-01-10 PROCEDURE — 63600175 PHARM REV CODE 636 W HCPCS: Performed by: PHYSICIAN ASSISTANT

## 2025-01-10 PROCEDURE — 37799 UNLISTED PX VASCULAR SURGERY: CPT

## 2025-01-10 PROCEDURE — 36620 INSERTION CATHETER ARTERY: CPT | Performed by: ANESTHESIOLOGY

## 2025-01-10 PROCEDURE — 37000008 HC ANESTHESIA 1ST 15 MINUTES: Performed by: THORACIC SURGERY (CARDIOTHORACIC VASCULAR SURGERY)

## 2025-01-10 PROCEDURE — 33518 CABG ARTERY-VEIN TWO: CPT | Mod: AS,,, | Performed by: PHYSICIAN ASSISTANT

## 2025-01-10 PROCEDURE — 63600175 PHARM REV CODE 636 W HCPCS: Performed by: THORACIC SURGERY (CARDIOTHORACIC VASCULAR SURGERY)

## 2025-01-10 PROCEDURE — 27201423 OPTIME MED/SURG SUP & DEVICES STERILE SUPPLY: Performed by: THORACIC SURGERY (CARDIOTHORACIC VASCULAR SURGERY)

## 2025-01-10 PROCEDURE — 36415 COLL VENOUS BLD VENIPUNCTURE: CPT

## 2025-01-10 PROCEDURE — 94002 VENT MGMT INPAT INIT DAY: CPT

## 2025-01-10 PROCEDURE — 63600175 PHARM REV CODE 636 W HCPCS: Performed by: NURSE ANESTHETIST, CERTIFIED REGISTERED

## 2025-01-10 PROCEDURE — P9016 RBC LEUKOCYTES REDUCED: HCPCS | Performed by: PHYSICIAN ASSISTANT

## 2025-01-10 PROCEDURE — 83735 ASSAY OF MAGNESIUM: CPT

## 2025-01-10 PROCEDURE — 99900031 HC PATIENT EDUCATION (STAT)

## 2025-01-10 PROCEDURE — 36000712 HC OR TIME LEV V 1ST 15 MIN: Performed by: THORACIC SURGERY (CARDIOTHORACIC VASCULAR SURGERY)

## 2025-01-10 PROCEDURE — 94760 N-INVAS EAR/PLS OXIMETRY 1: CPT | Mod: XB

## 2025-01-10 PROCEDURE — 63600175 PHARM REV CODE 636 W HCPCS: Performed by: INTERNAL MEDICINE

## 2025-01-10 PROCEDURE — 30233R1 TRANSFUSION OF NONAUTOLOGOUS PLATELETS INTO PERIPHERAL VEIN, PERCUTANEOUS APPROACH: ICD-10-PCS | Performed by: THORACIC SURGERY (CARDIOTHORACIC VASCULAR SURGERY)

## 2025-01-10 PROCEDURE — 5A1221Z PERFORMANCE OF CARDIAC OUTPUT, CONTINUOUS: ICD-10-PCS | Performed by: THORACIC SURGERY (CARDIOTHORACIC VASCULAR SURGERY)

## 2025-01-10 PROCEDURE — P9045 ALBUMIN (HUMAN), 5%, 250 ML: HCPCS | Mod: JZ,TB | Performed by: PHYSICIAN ASSISTANT

## 2025-01-10 PROCEDURE — 27200966 HC CLOSED SUCTION SYSTEM

## 2025-01-10 PROCEDURE — 94761 N-INVAS EAR/PLS OXIMETRY MLT: CPT | Mod: XB

## 2025-01-10 PROCEDURE — 85610 PROTHROMBIN TIME: CPT | Performed by: THORACIC SURGERY (CARDIOTHORACIC VASCULAR SURGERY)

## 2025-01-10 PROCEDURE — 37000009 HC ANESTHESIA EA ADD 15 MINS: Performed by: THORACIC SURGERY (CARDIOTHORACIC VASCULAR SURGERY)

## 2025-01-10 PROCEDURE — 36000713 HC OR TIME LEV V EA ADD 15 MIN: Performed by: THORACIC SURGERY (CARDIOTHORACIC VASCULAR SURGERY)

## 2025-01-10 PROCEDURE — C9248 INJ, CLEVIDIPINE BUTYRATE: HCPCS | Mod: JZ,TB | Performed by: NURSE ANESTHETIST, CERTIFIED REGISTERED

## 2025-01-10 PROCEDURE — 80053 COMPREHEN METABOLIC PANEL: CPT

## 2025-01-10 PROCEDURE — 27100171 HC OXYGEN HIGH FLOW UP TO 24 HOURS

## 2025-01-10 PROCEDURE — 02100Z9 BYPASS CORONARY ARTERY, ONE ARTERY FROM LEFT INTERNAL MAMMARY, OPEN APPROACH: ICD-10-PCS | Performed by: THORACIC SURGERY (CARDIOTHORACIC VASCULAR SURGERY)

## 2025-01-10 PROCEDURE — P9037 PLATE PHERES LEUKOREDU IRRAD: HCPCS

## 2025-01-10 PROCEDURE — 99900035 HC TECH TIME PER 15 MIN (STAT)

## 2025-01-10 PROCEDURE — 33533 CABG ARTERIAL SINGLE: CPT | Mod: ,,, | Performed by: THORACIC SURGERY (CARDIOTHORACIC VASCULAR SURGERY)

## 2025-01-10 PROCEDURE — 36556 INSERT NON-TUNNEL CV CATH: CPT | Mod: 59,,, | Performed by: ANESTHESIOLOGY

## 2025-01-10 PROCEDURE — 36430 TRANSFUSION BLD/BLD COMPNT: CPT

## 2025-01-10 PROCEDURE — 30233N1 TRANSFUSION OF NONAUTOLOGOUS RED BLOOD CELLS INTO PERIPHERAL VEIN, PERCUTANEOUS APPROACH: ICD-10-PCS | Performed by: THORACIC SURGERY (CARDIOTHORACIC VASCULAR SURGERY)

## 2025-01-10 PROCEDURE — D9220A PRA ANESTHESIA: Mod: ANES,,, | Performed by: ANESTHESIOLOGY

## 2025-01-10 PROCEDURE — 99900026 HC AIRWAY MAINTENANCE (STAT)

## 2025-01-10 PROCEDURE — 36415 COLL VENOUS BLD VENIPUNCTURE: CPT | Performed by: THORACIC SURGERY (CARDIOTHORACIC VASCULAR SURGERY)

## 2025-01-10 DEVICE — CLIP PENDITURE LAAC40
Type: IMPLANTABLE DEVICE | Site: HEART | Status: FUNCTIONAL
Brand: PENDITURE™

## 2025-01-10 RX ORDER — HYDROCODONE BITARTRATE AND ACETAMINOPHEN 500; 5 MG/1; MG/1
TABLET ORAL
Status: DISCONTINUED | OUTPATIENT
Start: 2025-01-10 | End: 2025-01-15 | Stop reason: HOSPADM

## 2025-01-10 RX ORDER — CALCIUM GLUCONATE 20 MG/ML
3 INJECTION, SOLUTION INTRAVENOUS
Status: DISCONTINUED | OUTPATIENT
Start: 2025-01-10 | End: 2025-01-11

## 2025-01-10 RX ORDER — FOLIC ACID 1 MG/1
1 TABLET ORAL DAILY
Status: DISCONTINUED | OUTPATIENT
Start: 2025-01-11 | End: 2025-01-15 | Stop reason: HOSPADM

## 2025-01-10 RX ORDER — FENTANYL CITRATE 50 UG/ML
200 INJECTION, SOLUTION INTRAMUSCULAR; INTRAVENOUS ONCE
Status: COMPLETED | OUTPATIENT
Start: 2025-01-10 | End: 2025-01-10

## 2025-01-10 RX ORDER — ASPIRIN 81 MG/1
81 TABLET ORAL DAILY
Status: DISCONTINUED | OUTPATIENT
Start: 2025-01-11 | End: 2025-01-15 | Stop reason: HOSPADM

## 2025-01-10 RX ORDER — HYDROCODONE BITARTRATE AND ACETAMINOPHEN 5; 325 MG/1; MG/1
1 TABLET ORAL EVERY 4 HOURS PRN
Status: DISCONTINUED | OUTPATIENT
Start: 2025-01-10 | End: 2025-01-15 | Stop reason: HOSPADM

## 2025-01-10 RX ORDER — CALCIUM GLUCONATE 20 MG/ML
1 INJECTION, SOLUTION INTRAVENOUS
Status: DISCONTINUED | OUTPATIENT
Start: 2025-01-10 | End: 2025-01-11

## 2025-01-10 RX ORDER — PROPOFOL 10 MG/ML
INJECTION, EMULSION INTRAVENOUS
Status: COMPLETED
Start: 2025-01-10 | End: 2025-01-10

## 2025-01-10 RX ORDER — ACETAMINOPHEN 10 MG/ML
1000 INJECTION, SOLUTION INTRAVENOUS EVERY 8 HOURS
Status: DISPENSED | OUTPATIENT
Start: 2025-01-10 | End: 2025-01-11

## 2025-01-10 RX ORDER — ACETAMINOPHEN 650 MG/20.3ML
650 LIQUID ORAL EVERY 6 HOURS PRN
Status: CANCELLED | OUTPATIENT
Start: 2025-01-10

## 2025-01-10 RX ORDER — CALCIUM CHLORIDE INJECTION 100 MG/ML
INJECTION, SOLUTION INTRAVENOUS
Status: DISCONTINUED | OUTPATIENT
Start: 2025-01-10 | End: 2025-01-10

## 2025-01-10 RX ORDER — PROTAMINE SULFATE 10 MG/ML
INJECTION, SOLUTION INTRAVENOUS
Status: DISCONTINUED | OUTPATIENT
Start: 2025-01-10 | End: 2025-01-10

## 2025-01-10 RX ORDER — FENTANYL CITRATE 50 UG/ML
INJECTION, SOLUTION INTRAMUSCULAR; INTRAVENOUS
Status: DISPENSED
Start: 2025-01-10 | End: 2025-01-11

## 2025-01-10 RX ORDER — MUPIROCIN 20 MG/G
OINTMENT TOPICAL
Status: DISCONTINUED | OUTPATIENT
Start: 2025-01-10 | End: 2025-01-10 | Stop reason: HOSPADM

## 2025-01-10 RX ORDER — FAMOTIDINE 10 MG/ML
20 INJECTION INTRAVENOUS DAILY
Status: DISCONTINUED | OUTPATIENT
Start: 2025-01-10 | End: 2025-01-15

## 2025-01-10 RX ORDER — METOCLOPRAMIDE HYDROCHLORIDE 5 MG/ML
5 INJECTION INTRAMUSCULAR; INTRAVENOUS EVERY 6 HOURS PRN
Status: DISCONTINUED | OUTPATIENT
Start: 2025-01-10 | End: 2025-01-15 | Stop reason: HOSPADM

## 2025-01-10 RX ORDER — POTASSIUM CHLORIDE 14.9 MG/ML
60 INJECTION INTRAVENOUS
Status: DISCONTINUED | OUTPATIENT
Start: 2025-01-10 | End: 2025-01-11

## 2025-01-10 RX ORDER — SUCRALFATE 1 G/1
1 TABLET ORAL
Status: DISCONTINUED | OUTPATIENT
Start: 2025-01-11 | End: 2025-01-15 | Stop reason: HOSPADM

## 2025-01-10 RX ORDER — TRANEXAMIC ACID 100 MG/ML
INJECTION, SOLUTION INTRAVENOUS
Status: DISCONTINUED | OUTPATIENT
Start: 2025-01-10 | End: 2025-01-10

## 2025-01-10 RX ORDER — KETOROLAC TROMETHAMINE 30 MG/ML
30 INJECTION, SOLUTION INTRAMUSCULAR; INTRAVENOUS EVERY 6 HOURS PRN
Status: DISPENSED | OUTPATIENT
Start: 2025-01-10 | End: 2025-01-13

## 2025-01-10 RX ORDER — OXYCODONE HYDROCHLORIDE 10 MG/1
10 TABLET ORAL EVERY 4 HOURS PRN
Status: DISCONTINUED | OUTPATIENT
Start: 2025-01-10 | End: 2025-01-13

## 2025-01-10 RX ORDER — HEPARIN SODIUM 1000 [USP'U]/ML
INJECTION, SOLUTION INTRAVENOUS; SUBCUTANEOUS
Status: DISCONTINUED | OUTPATIENT
Start: 2025-01-10 | End: 2025-01-10 | Stop reason: HOSPADM

## 2025-01-10 RX ORDER — DEXTROSE MONOHYDRATE AND SODIUM CHLORIDE 5; .45 G/100ML; G/100ML
INJECTION, SOLUTION INTRAVENOUS CONTINUOUS
Status: DISCONTINUED | OUTPATIENT
Start: 2025-01-10 | End: 2025-01-13

## 2025-01-10 RX ORDER — LIDOCAINE HYDROCHLORIDE 20 MG/ML
INJECTION, SOLUTION EPIDURAL; INFILTRATION; INTRACAUDAL; PERINEURAL
Status: DISCONTINUED | OUTPATIENT
Start: 2025-01-10 | End: 2025-01-10

## 2025-01-10 RX ORDER — DOCUSATE SODIUM 100 MG/1
100 CAPSULE, LIQUID FILLED ORAL 2 TIMES DAILY
Status: DISCONTINUED | OUTPATIENT
Start: 2025-01-11 | End: 2025-01-15 | Stop reason: HOSPADM

## 2025-01-10 RX ORDER — ALBUMIN HUMAN 50 G/1000ML
12.5 SOLUTION INTRAVENOUS
Status: DISCONTINUED | OUTPATIENT
Start: 2025-01-10 | End: 2025-01-15 | Stop reason: HOSPADM

## 2025-01-10 RX ORDER — MIDAZOLAM HYDROCHLORIDE 1 MG/ML
INJECTION INTRAMUSCULAR; INTRAVENOUS
Status: DISCONTINUED | OUTPATIENT
Start: 2025-01-10 | End: 2025-01-10

## 2025-01-10 RX ORDER — MAGNESIUM SULFATE HEPTAHYDRATE 40 MG/ML
4 INJECTION, SOLUTION INTRAVENOUS
Status: DISCONTINUED | OUTPATIENT
Start: 2025-01-10 | End: 2025-01-11

## 2025-01-10 RX ORDER — POTASSIUM CHLORIDE 29.8 MG/ML
40 INJECTION INTRAVENOUS
Status: DISCONTINUED | OUTPATIENT
Start: 2025-01-10 | End: 2025-01-11

## 2025-01-10 RX ORDER — CALCIUM GLUCONATE 20 MG/ML
2 INJECTION, SOLUTION INTRAVENOUS
Status: DISCONTINUED | OUTPATIENT
Start: 2025-01-10 | End: 2025-01-11

## 2025-01-10 RX ORDER — VASOPRESSIN 20 [USP'U]/ML
INJECTION, SOLUTION INTRAMUSCULAR; SUBCUTANEOUS
Status: DISCONTINUED | OUTPATIENT
Start: 2025-01-10 | End: 2025-01-10

## 2025-01-10 RX ORDER — HYDROCODONE BITARTRATE AND ACETAMINOPHEN 500; 5 MG/1; MG/1
TABLET ORAL
Status: DISCONTINUED | OUTPATIENT
Start: 2025-01-10 | End: 2025-01-10 | Stop reason: HOSPADM

## 2025-01-10 RX ORDER — ETOMIDATE 2 MG/ML
INJECTION INTRAVENOUS
Status: DISCONTINUED | OUTPATIENT
Start: 2025-01-10 | End: 2025-01-10

## 2025-01-10 RX ORDER — FENTANYL CITRATE 50 UG/ML
INJECTION, SOLUTION INTRAMUSCULAR; INTRAVENOUS
Status: DISCONTINUED | OUTPATIENT
Start: 2025-01-10 | End: 2025-01-10

## 2025-01-10 RX ORDER — POTASSIUM CHLORIDE 14.9 MG/ML
20 INJECTION INTRAVENOUS
Status: DISCONTINUED | OUTPATIENT
Start: 2025-01-10 | End: 2025-01-11

## 2025-01-10 RX ORDER — MEPERIDINE HYDROCHLORIDE 25 MG/ML
25 INJECTION INTRAMUSCULAR; INTRAVENOUS; SUBCUTANEOUS ONCE
Status: DISCONTINUED | OUTPATIENT
Start: 2025-01-10 | End: 2025-01-10

## 2025-01-10 RX ORDER — ONDANSETRON HYDROCHLORIDE 2 MG/ML
4 INJECTION, SOLUTION INTRAVENOUS EVERY 4 HOURS PRN
Status: DISCONTINUED | OUTPATIENT
Start: 2025-01-10 | End: 2025-01-15 | Stop reason: HOSPADM

## 2025-01-10 RX ORDER — MORPHINE SULFATE 4 MG/ML
4 INJECTION, SOLUTION INTRAMUSCULAR; INTRAVENOUS EVERY 4 HOURS PRN
Status: DISCONTINUED | OUTPATIENT
Start: 2025-01-10 | End: 2025-01-11

## 2025-01-10 RX ORDER — HEPARIN SODIUM 1000 [USP'U]/ML
INJECTION, SOLUTION INTRAVENOUS; SUBCUTANEOUS
Status: DISCONTINUED | OUTPATIENT
Start: 2025-01-10 | End: 2025-01-10

## 2025-01-10 RX ORDER — MAGNESIUM SULFATE HEPTAHYDRATE 40 MG/ML
2 INJECTION, SOLUTION INTRAVENOUS
Status: DISCONTINUED | OUTPATIENT
Start: 2025-01-10 | End: 2025-01-11

## 2025-01-10 RX ORDER — LOPERAMIDE HYDROCHLORIDE 2 MG/1
2 CAPSULE ORAL CONTINUOUS PRN
Status: CANCELLED | OUTPATIENT
Start: 2025-01-10

## 2025-01-10 RX ORDER — MUPIROCIN 20 MG/G
OINTMENT TOPICAL 2 TIMES DAILY
Status: DISCONTINUED | OUTPATIENT
Start: 2025-01-10 | End: 2025-01-15 | Stop reason: HOSPADM

## 2025-01-10 RX ORDER — VANCOMYCIN HCL IN 5 % DEXTROSE 1G/250ML
1000 PLASTIC BAG, INJECTION (ML) INTRAVENOUS
Status: DISCONTINUED | OUTPATIENT
Start: 2025-01-10 | End: 2025-01-10

## 2025-01-10 RX ORDER — MEPERIDINE HYDROCHLORIDE 25 MG/ML
12.5 INJECTION INTRAMUSCULAR; INTRAVENOUS; SUBCUTANEOUS ONCE
Status: COMPLETED | OUTPATIENT
Start: 2025-01-10 | End: 2025-01-10

## 2025-01-10 RX ORDER — METOPROLOL TARTRATE 25 MG/1
12.5 TABLET ORAL 2 TIMES DAILY
Status: DISCONTINUED | OUTPATIENT
Start: 2025-01-11 | End: 2025-01-15 | Stop reason: HOSPADM

## 2025-01-10 RX ORDER — ENOXAPARIN SODIUM 100 MG/ML
40 INJECTION SUBCUTANEOUS EVERY 24 HOURS
Status: DISCONTINUED | OUTPATIENT
Start: 2025-01-11 | End: 2025-01-15 | Stop reason: HOSPADM

## 2025-01-10 RX ORDER — MIDAZOLAM HYDROCHLORIDE 2 MG/2ML
INJECTION, SOLUTION INTRAMUSCULAR; INTRAVENOUS
Status: COMPLETED
Start: 2025-01-10 | End: 2025-01-10

## 2025-01-10 RX ORDER — ROCURONIUM BROMIDE 10 MG/ML
INJECTION, SOLUTION INTRAVENOUS
Status: DISCONTINUED | OUTPATIENT
Start: 2025-01-10 | End: 2025-01-10

## 2025-01-10 RX ORDER — DEXMEDETOMIDINE HYDROCHLORIDE 4 UG/ML
0-1.4 INJECTION, SOLUTION INTRAVENOUS CONTINUOUS
Status: DISCONTINUED | OUTPATIENT
Start: 2025-01-10 | End: 2025-01-11

## 2025-01-10 RX ORDER — PAPAVERINE HYDROCHLORIDE 30 MG/ML
INJECTION INTRAMUSCULAR; INTRAVENOUS
Status: DISCONTINUED | OUTPATIENT
Start: 2025-01-10 | End: 2025-01-10 | Stop reason: HOSPADM

## 2025-01-10 RX ORDER — SUCCINYLCHOLINE CHLORIDE 20 MG/ML
INJECTION INTRAMUSCULAR; INTRAVENOUS
Status: DISCONTINUED | OUTPATIENT
Start: 2025-01-10 | End: 2025-01-10

## 2025-01-10 RX ORDER — PROPOFOL 10 MG/ML
0-50 INJECTION, EMULSION INTRAVENOUS CONTINUOUS
Status: DISCONTINUED | OUTPATIENT
Start: 2025-01-10 | End: 2025-01-11

## 2025-01-10 RX ADMIN — VASOPRESSIN 0.5 UNITS: 20 INJECTION INTRAVENOUS at 01:01

## 2025-01-10 RX ADMIN — FENTANYL CITRATE 100 MCG: 50 INJECTION, SOLUTION INTRAMUSCULAR; INTRAVENOUS at 11:01

## 2025-01-10 RX ADMIN — DEXTROSE MONOHYDRATE AND SODIUM CHLORIDE: 5; .45 INJECTION, SOLUTION INTRAVENOUS at 02:01

## 2025-01-10 RX ADMIN — POTASSIUM CHLORIDE 20 MEQ: 14.9 INJECTION, SOLUTION INTRAVENOUS at 05:01

## 2025-01-10 RX ADMIN — MIDAZOLAM HYDROCHLORIDE 2 MG: 1 INJECTION, SOLUTION INTRAMUSCULAR; INTRAVENOUS at 09:01

## 2025-01-10 RX ADMIN — VASOPRESSIN 1 UNITS: 20 INJECTION INTRAVENOUS at 01:01

## 2025-01-10 RX ADMIN — ROCURONIUM BROMIDE 25 MG: 10 SOLUTION INTRAVENOUS at 12:01

## 2025-01-10 RX ADMIN — SODIUM CHLORIDE: 9 INJECTION, SOLUTION INTRAVENOUS at 10:01

## 2025-01-10 RX ADMIN — CALCIUM CHLORIDE INJECTION 250 MG: 100 INJECTION, SOLUTION INTRAVENOUS at 01:01

## 2025-01-10 RX ADMIN — ETOMIDATE 20 MG: 2 INJECTION INTRAVENOUS at 10:01

## 2025-01-10 RX ADMIN — ONDANSETRON 4 MG: 2 INJECTION INTRAMUSCULAR; INTRAVENOUS at 03:01

## 2025-01-10 RX ADMIN — ROCURONIUM BROMIDE 50 MG: 10 SOLUTION INTRAVENOUS at 11:01

## 2025-01-10 RX ADMIN — LIDOCAINE HYDROCHLORIDE 80 MG: 20 INJECTION, SOLUTION INTRAVENOUS at 10:01

## 2025-01-10 RX ADMIN — TRANEXAMIC ACID 960 MG: 100 INJECTION, SOLUTION INTRAVENOUS at 11:01

## 2025-01-10 RX ADMIN — ACETAMINOPHEN 1000 MG: 10 INJECTION, SOLUTION INTRAVENOUS at 09:01

## 2025-01-10 RX ADMIN — MORPHINE SULFATE 4 MG: 4 INJECTION, SOLUTION INTRAMUSCULAR; INTRAVENOUS at 03:01

## 2025-01-10 RX ADMIN — SUCCINYLCHOLINE CHLORIDE 140 MG: 20 INJECTION, SOLUTION INTRAMUSCULAR; INTRAVENOUS at 10:01

## 2025-01-10 RX ADMIN — SUGAMMADEX 200 MG: 100 INJECTION, SOLUTION INTRAVENOUS at 02:01

## 2025-01-10 RX ADMIN — MIDAZOLAM HYDROCHLORIDE 1.5 MG: 1 INJECTION, SOLUTION INTRAMUSCULAR; INTRAVENOUS at 12:01

## 2025-01-10 RX ADMIN — TRANEXAMIC ACID 960 MG: 100 INJECTION, SOLUTION INTRAVENOUS at 01:01

## 2025-01-10 RX ADMIN — ROCURONIUM BROMIDE 10 MG: 10 SOLUTION INTRAVENOUS at 10:01

## 2025-01-10 RX ADMIN — DEXMEDETOMIDINE HYDROCHLORIDE 0.4 MCG/KG/HR: 400 INJECTION INTRAVENOUS at 12:01

## 2025-01-10 RX ADMIN — FENTANYL CITRATE 50 MCG: 50 INJECTION, SOLUTION INTRAMUSCULAR; INTRAVENOUS at 11:01

## 2025-01-10 RX ADMIN — KETOROLAC TROMETHAMINE 30 MG: 30 INJECTION, SOLUTION INTRAMUSCULAR; INTRAVENOUS at 11:01

## 2025-01-10 RX ADMIN — MIDAZOLAM HYDROCHLORIDE 2 MG: 1 INJECTION, SOLUTION INTRAMUSCULAR; INTRAVENOUS at 10:01

## 2025-01-10 RX ADMIN — HEPARIN SODIUM 30000 UNITS: 1000 INJECTION, SOLUTION INTRAVENOUS; SUBCUTANEOUS at 11:01

## 2025-01-10 RX ADMIN — PROPOFOL 40 MCG/KG/MIN: 10 INJECTION, EMULSION INTRAVENOUS at 03:01

## 2025-01-10 RX ADMIN — CLEVIPIDINE 2 MG/HR: 0.5 EMULSION INTRAVENOUS at 11:01

## 2025-01-10 RX ADMIN — VANCOMYCIN HYDROCHLORIDE 1000 MG: 1 INJECTION, POWDER, LYOPHILIZED, FOR SOLUTION INTRAVENOUS at 09:01

## 2025-01-10 RX ADMIN — KETOROLAC TROMETHAMINE 30 MG: 30 INJECTION, SOLUTION INTRAMUSCULAR; INTRAVENOUS at 03:01

## 2025-01-10 RX ADMIN — ROCURONIUM BROMIDE 40 MG: 10 SOLUTION INTRAVENOUS at 11:01

## 2025-01-10 RX ADMIN — INSULIN HUMAN 1 UNITS/HR: 1 INJECTION, SOLUTION INTRAVENOUS at 02:01

## 2025-01-10 RX ADMIN — VANCOMYCIN HYDROCHLORIDE 1000 MG: 1 INJECTION, POWDER, LYOPHILIZED, FOR SOLUTION INTRAVENOUS at 08:01

## 2025-01-10 RX ADMIN — MUPIROCIN: 20 OINTMENT TOPICAL at 08:01

## 2025-01-10 RX ADMIN — FENTANYL CITRATE 250 MCG: 50 INJECTION, SOLUTION INTRAMUSCULAR; INTRAVENOUS at 10:01

## 2025-01-10 RX ADMIN — MEPERIDINE HYDROCHLORIDE 12.5 MG: 25 INJECTION INTRAMUSCULAR; INTRAVENOUS; SUBCUTANEOUS at 05:01

## 2025-01-10 RX ADMIN — PROTAMINE SULFATE 350 MG: 10 INJECTION, SOLUTION INTRAVENOUS at 01:01

## 2025-01-10 RX ADMIN — DEXMEDETOMIDINE HYDROCHLORIDE 0 MCG/KG/HR: 4 INJECTION, SOLUTION INTRAVENOUS at 04:01

## 2025-01-10 RX ADMIN — EPINEPHRINE 0.02 MCG/KG/MIN: 1 INJECTION INTRAMUSCULAR; INTRAVENOUS; SUBCUTANEOUS at 01:01

## 2025-01-10 RX ADMIN — PROPOFOL 30 MCG/KG/MIN: 10 INJECTION, EMULSION INTRAVENOUS at 06:01

## 2025-01-10 RX ADMIN — ALBUMIN (HUMAN) 12.5 G: 12.5 SOLUTION INTRAVENOUS at 09:01

## 2025-01-10 RX ADMIN — FENTANYL CITRATE 200 MCG: 50 INJECTION, SOLUTION INTRAMUSCULAR; INTRAVENOUS at 04:01

## 2025-01-10 NOTE — ANESTHESIA PROCEDURE NOTES
Intubation    Date/Time: 1/10/2025 10:55 AM    Performed by: Miri Peacock  Authorized by: Dagmar Saunders MD    Intubation:     Induction:  Intravenous    Intubated:  Postinduction    Mask Ventilation:  Easy mask    Attempts:  1    Attempted By:  Student    Method of Intubation:  Video laryngoscopy    Blade:  Glidescope 3    Laryngeal View Grade: Grade I - full view of cords      Difficult Airway Encountered?: No      Complications:  None    Airway Device:  Oral endotracheal tube    Airway Device Size:  8.0    Style/Cuff Inflation:  Cuffed (inflated to minimal occlusive pressure)    Tube secured:  23    Secured at:  The lips    Placement Verified By:  Capnometry    Complicating Factors:  Poor neck/head extension and small mouth    Findings Post-Intubation:  BS equal bilateral and atraumatic/condition of teeth unchanged  Notes:      Very stiff, limited neck mobility, easy intubation with MAC3 glidescope.

## 2025-01-10 NOTE — H&P
Critical Care Medicine History and Physical Note   [facility] - [facility] ICU      Patient Name: Benito Montague III  MRN: [id number]  Admission Date: 1/8/2025  Hospital Length of Stay: 2 days  Code Status: Full Code  Attending Provider: Al Hallman MD  Primary Care Provider: Lew Sullivan MD   Principal Problem: <principal problem not specified>        HPI:  The patient is a 64-year-old male with a medical history of hypertension, essential tremor, sleep apnea, who underwent a three-vessel CABG with left atrial appendage ligation today.  There were no reported intraoperative complications.  Patient was seen postoperatively in the ICU still under intraoperative sedation on mechanical ventilatory support.        Past Medical History:   Diagnosis Date    Essential (primary) hypertension     Essential tremor     Seasonal allergies     Sleep apnea, unspecified          No past surgical history on file.      Social History     Socioeconomic History    Marital status:    Tobacco Use    Smoking status: Never    Smokeless tobacco: Never   Substance and Sexual Activity    Alcohol use: Yes    Drug use: Never     Social Drivers of Health     Financial Resource Strain: Patient Declined (1/8/2025)    Overall Financial Resource Strain (CARDIA)     Difficulty of Paying Living Expenses: Patient declined   Food Insecurity: No Food Insecurity (1/9/2025)    Hunger Vital Sign     Worried About Running Out of Food in the Last Year: Never true     Ran Out of Food in the Last Year: Never true   Transportation Needs: No Transportation Needs (1/9/2025)    TRANSPORTATION NEEDS     Transportation : No   Stress: Patient Declined (1/8/2025)    [employer] - Occupational Stress Questionnaire     Feeling of Stress : Patient declined   Housing Stability: Low Risk  (1/9/2025)    Housing Stability Vital Sign     Unable to Pay for Housing in the Last Year: No     Homeless in the Last Year: No          Family History   Problem Relation Name Age of Onset    Diabetes Mother      Heart disease Father           Drug Allergies:   Review of patient's allergies indicates:   Allergen Reactions    Penicillin Hives    Tuberculin, purified protein derivative Rash         Current Infusions:   D5 and 0.45% NaCl   Intravenous Continuous        EPINEPHrine  0-2 mcg/kg/min Intravenous Continuous        insulin regular 1 units/mL infusion orderable (CTS POST-OP)  0-52 Units/hr Intravenous Continuous             Scheduled Medications:     amLODIPine  5 mg Oral Daily    [START ON 1/11/2025] aspirin  81 mg Oral Daily    atorvastatin  40 mg Oral Daily    [START ON 1/11/2025] docusate sodium  100 mg Oral BID    [START ON 1/11/2025] enoxparin  40 mg Subcutaneous Daily    famotidine (PF)  20 mg Intravenous Daily    [START ON 1/11/2025] folic acid  1 mg Oral Daily    lisinopriL  40 mg Oral Daily    [START ON 1/11/2025] metoprolol tartrate  12.5 mg Oral BID    mupirocin   Nasal BID    [START ON 1/11/2025] sucralfate  1 g Oral QID (AC & HS)    vancomycin (VANCOCIN) 1,000 mg in 0.9% NaCl 250 mL IVPB (admixture device)  1,000 mg Intravenous Q12H         PRN Medications:     Current Facility-Administered Medications:     albumin human 5%, 12.5 g, Intravenous, PRN    calcium gluconate IVPB, 1 g, Intravenous, PRN    calcium gluconate IVPB, 2 g, Intravenous, PRN    calcium gluconate IVPB, 3 g, Intravenous, PRN    dextrose 50%, 12.5 g, Intravenous, PRN    dextrose 50%, 25 g, Intravenous, PRN    HYDROcodone-acetaminophen, 1 tablet, Oral, Q4H PRN    ketorolac, 30 mg, Intravenous, Q6H PRN    lactulose 10 gram/15 ml, 20 g, Oral, Q6H PRN    magnesium sulfate IVPB, 2 g, Intravenous, PRN    magnesium sulfate IVPB, 4 g, Intravenous, PRN    metoclopramide, 5 mg, Intravenous, Q6H PRN    morphine, 4 mg, Intravenous, Q4H PRN    ondansetron, 4 mg, Intravenous, Q4H PRN    oxyCODONE, 10 mg, Oral, Q4H PRN    potassium chloride in water, 20 mEq,  Intravenous, PRN    potassium chloride in water, 60 mEq, Intravenous, PRN    potassium chloride in water, 40 mEq, Intravenous, PRN    sodium phosphate 15 mmol in D5W 250 mL IVPB, 15 mmol, Intravenous, PRN    sodium phosphate 20.01 mmol in D5W 250 mL IVPB, 20.01 mmol, Intravenous, PRN    sodium phosphate 30 mmol in D5W 250 mL IVPB, 30 mmol, Intravenous, PRN    vancomycin (VANCOCIN) 1,000 mg in D5W 250 mL IVPB (admixture device), 1,000 mg, Intravenous, On Call Procedure      Review of Systems   Reason unable to perform ROS: Unable to perform full 14 point review of systems as patient is intubated sedated unresponsive.         Vital Signs:    Vitals:    01/10/25 0908   BP: (!) 144/64   Pulse: (!) 53   Resp: 18   Temp:          Fluid Balance:     Intake/Output Summary (Last 24 hours) at 1/10/2025 1453  Last data filed at 1/10/2025 1434  Gross per 24 hour   Intake 1311 ml   Output 735 ml   Net 576 ml         Physical Exam  Vitals and nursing note reviewed.   Constitutional:       General: He is not in acute distress.     Appearance: Normal appearance. He is ill-appearing. He is not toxic-appearing.   HENT:      Head: Normocephalic and atraumatic.      Right Ear: External ear normal.      Left Ear: External ear normal.      Nose: Nose normal.      Mouth/Throat:      Pharynx: No posterior oropharyngeal erythema.      Comments: Unable to visualize posterior oropharynx secondary to endotracheal tube  Eyes:      General: No scleral icterus.     Conjunctiva/sclera: Conjunctivae normal.      Pupils: Pupils are equal, round, and reactive to light.   Neck:      Vascular: No carotid bruit.   Cardiovascular:      Rate and Rhythm: Normal rate and regular rhythm.      Pulses: Normal pulses.      Heart sounds: Normal heart sounds. No murmur heard.     No friction rub. No gallop.      Comments: Median sternotomy wound site clean and dry mediastinal and chest tubes in place with bloody output  Pulmonary:      Effort: Pulmonary effort is  normal. No respiratory distress.      Breath sounds: Normal breath sounds. No wheezing, rhonchi or rales.      Comments: Intubated, on mechanical ventilation  Abdominal:      General: Abdomen is flat. Bowel sounds are normal. There is no distension.      Palpations: Abdomen is soft.      Tenderness: There is no abdominal tenderness. There is no guarding or rebound.   Musculoskeletal:         General: No swelling or deformity.      Cervical back: Neck supple. No rigidity or tenderness.   Skin:     General: Skin is warm and dry.      Capillary Refill: Capillary refill takes less than 2 seconds.      Findings: No erythema or rash.   Neurological:      Comments: Unable to fully assess neurologic status and orientation secondary to patient being intubated, sedated and nonverbal   Psychiatric:      Comments: Unable to fully assess as patient is intubated and nonverbal           Ventilator         Laboratory Studies:     Recent Labs   Lab 01/10/25  1337   PH 7.429   PCO2 32.7*   PO2 272*   HCO3 21.7*   POCSATURATED 100   BE -3*       Recent Labs   Lab 01/10/25  1343   WBC 12.37*   RBC 3.71*   HGB 11.6*   HCT 34.0*   PLT 66*   MCV 91.6   MCH 31.3*   MCHC 34.1       Recent Labs   Lab 01/10/25  0423 01/10/25  1343   GLUCOSE 97 163*    138   K 4.0 4.4    110*   CO2 26 22*   BUN 12.8 9.9   CREATININE 0.97 0.83   CALCIUM 8.8 10.1*   MG 2.30  --          Microbiology Data:   Microbiology Results (last 7 days)       Procedure Component Value Units Date/Time    Respiratory Culture [[id number]]     Order Status: Sent Specimen: Sputum             Imaging reviewed:  Electrophysiology Procedure  Procedure performed in the Invasive Lab    - See Procedure Log link below for nursing documentation    - See OpNote on Surgeries Tab for physician findings    - See Imaging Tab for radiologist dictation  Cardiac catheterization  Procedure performed in the Invasive Lab    - See Procedure Log link below for nursing documentation    -  "See OpNote on Surgeries Tab for physician findings    - See Imaging Tab for radiologist dictation        2D ECHO Results    No results found in the last 24 hours.       Pulmonary Functions Testing Results:    No results found for: "FEV1", "FVC", "SUP5NCE", "TLC", "DLCO"      Assessment and Plan:    Assessment:  Postop day 0 status post three-vessel CABG with left atrial appendage ligation  Hypertension   Obstructive sleep apnea    Plan:  -discontinue lines tubes medications mechanical ventilation per CV surgery protocol   -resume home medications when appropriate   -utilize home CPAP and/or oxygen if necessary q.h.s. and with naps        Davon Wyatt MD  1/10/2025  Pulmonology/Critical Care    "

## 2025-01-10 NOTE — OP NOTE
Preop diagnosis:  Severe coronary artery disease      Procedure:  Coronary artery bypass grafting X 3 ,   Left internal mammary artery to the LAD   Saphenous venous graft to   OM 2  Saphenous venous graft to    PDA  Ligation OF FABY  Endoscopic venous harvesting left greater saphenous vein          Postop diagnosis:  The same as preop    Date: 1/10/2025    Anesthesia:  General    Blood loss: Per perfusion     Surgeon: Al Hallman MD    Assistant: Dana BOURGEOIS        Under informed consent the  patient was taken the OR, put in a supine position and  general anesthesia was induced and therefore maintained for remainder of the procedure.All the pressure points were padded equally. The skin over the chest abdomen and legs was prepped and draped in the usual sterile fashion. IV antibiotics were administered. Anesthesia has placed the appropriate lines.  Preop KENNETH was performed revealing excellent contractility    Endoscopic venous harvesting was performed left lower extremity with good quality vein.  A Midline incision was made followed by taking down the subcutaneous tissue and fascia exposing the sternum that was penetrated in the midline. The mammary was harvested on a pedicle, the patient was heparinized. A midline retractor was placed, the pericardium was opened and pericardial stay sutures were placed. Ascending aortic cannulation was performed, the mammary was cut, the distal pedicle was ligated and clipped and proximal pedicle was controlled with a bulldog.The mammary had good flow in it.  Dual stage venous cannula was placed in the right atrium and when the ACT was therapeutic the patient went on full cardiopulmonary bypass with good emptying.  Cross-clamp was placed followed by antegrade dose of cardioplegia and 1 L of cold blood repeated throughout the case at 20-30 minutes intervals.  We had good either isoelectricity throughout the whole case.  The patient's temperature was allowed to drift to 34°  C.    Examination over the lateral aspect of the heart revealed the 1st OM vessel.  This was opened and has a diameter of   2    mm.  This was anastomosed to a reverse segment of saphenous vein with good flow down the graft.  The vein was cut to the appropriate length.  The Flores was lidated. Next the right coronary artery was examined this was opened at the level of  PDA        , this was a  1.75     mm vessel.  This was anastomosed to reverse segment of saphenous vein with good flow down the graft, the vein was cut to the appropriate lengths.  A slit was made in the pericardium, the LAD was opened in the mid epicardium.  This was  2     millimiters in diameter.  This was anastomosed to the mammary in running Prolene fashion, the mammary pedicle was tacked to the epicardium.  When the mammary bulldog was released there was brisk blood flow down the LAD indicating a very patent anastomosis.  The patient was being rewarmed, the cross-clamp was removed and a  partial occlusion clamp was placed.  Two arteriotomies were performed and a 4 mm punch was used, the proximal anastomoses were made with running Prolene.  The partial occluding clamp was released.  The left chest was evacuated of blood.  The proximal and distal anastomosis were checked for hemostasis.  When the patient was warm, he came off pump with no problem.  Heparin was reversed with protamine.  The mediastinum and left chest were drained. The patient was decannulated.  The sternum was wired and the wounds were closed in layers absorbable sutures. The patient tolerated the procedure well.  Postop KENNETH revealed good contractility.      he was transferred to the ICU in acceptable condition.

## 2025-01-10 NOTE — ANESTHESIA PROCEDURE NOTES
Central Line    Diagnosis: Coronary Artery Disease  Patient location during procedure: done in OR  Procedure Urgency: Routine  Procedure start time: 1/10/2025 10:57 AM  Timeout: 1/10/2025 10:57 AM  Procedure end time: 1/10/2025 11:04 AM      Staffing  Authorizing Provider: Dagmar Saunders MD  Performing Provider: Dagmar Saunders MD    Staffing  Performed by: Dagmar Saunders MD  Authorized by: Dagmar Saunders MD    Anesthesiologist was present at the time of the procedure.  Preanesthetic Checklist  Completed: patient identified, risks and benefits discussed, monitors and equipment checked, timeout performed and anesthesia consent given  Indication   Indication: hemodynamic monitoring, vascular access, med administration     Anesthesia   general anesthesia    Central Line   Skin Prep: skin prepped with ChloraPrep, skin prep agent completely dried prior to procedure  Sterile Barriers Followed: Yes    All five maximal barriers used- gloves, gown, cap, mask, and large sterile sheet    hand hygiene performed prior to central venous catheter insertion  Location: right internal jugular.   Catheter type: double lumen  Catheter Size: 8 Fr  Inserted Catheter Length: 16 cm  Ultrasound: vascular probe with ultrasound   Vessel Caliber: medium, patent, compressibility normal  Needle advanced into vessel with real time Ultrasound guidance.  Guidewire confirmed in vessel.  Image recorded and saved.  sterile gel and probe cover used in ultrasound-guided central venous catheter insertion  Manometry: none  Insertion Attempts: 1   Securement:line sutured, chlorhexidine patch, sterile dressing applied and blood return through all ports    Post-Procedure   X-Ray: successful placement   Adverse Events:none      Guidewire Guidewire removed intact.

## 2025-01-10 NOTE — ANESTHESIA PROCEDURE NOTES
Arterial    Diagnosis: Coronary Artery Disease    Patient location during procedure: holding area  Timeout: 1/10/2025 9:37 AM  Procedure end time: 1/10/2025 9:38 AM    Staffing  Authorizing Provider: Dagmar Saunders MD  Performing Provider: Dagmar Saunders MD    Staffing  Performed by: Dagmar Saunders MD  Authorized by: Dagmar Saunders MD    Anesthesiologist was present at the time of the procedure.    Preanesthetic Checklist  Completed: patient identified, IV checked, site marked, risks and benefits discussed, surgical consent, monitors and equipment checked, pre-op evaluation, timeout performed and anesthesia consent givenArterial  Skin Prep: chlorhexidine gluconate  Local Infiltration: lidocaine  Orientation: left  Location: radial    Catheter Size: 20 G  Catheter placement by Anatomical landmarks. Heme positive aspiration all ports. Insertion Attempts: 1  Assessment  Dressing: secured with tape and tegaderm  Patient: Tolerated well

## 2025-01-10 NOTE — ANESTHESIA PREPROCEDURE EVALUATION
01/10/2025  Benito Montague III is a 64 y.o., male.  with a history of CAD, HTN, DM II, HLD, BRENNAN, who is known to [facility], Dr. Painting. He presented to the [facility] for a scheduled LHC after he was found to have an abnormal EKG and Stress Test. He underwent a LHC on 1.8.24 which revealed Multi-vessel CAD. He was then transferred to [facility] for a CABG       LHC (1.8.25):  RCA:  of the mid RCA. The distal vessel fills via collaterals from the left. Left main: Severe distal LM disease 60-70% eccentric, calcified lesion. LCX: gives rise to 2 small and 2 large OM branches. Of the 2 larger branches the upper has 40-50% stenosis while the lower has a 60-70% stenosis. LAD: mild disease. Gives rise to 2 large diagonal branches.      LVEDP: 15 mmHg  Ao-LV Gradient: 0 mmHg     MPI (1.3.25):  This is an abnormal perfusion study. This scan is suggestive of moderate risk for future cardiovascular events. Medium reversible perfusion abnormality of moderate intensity in the inferior septal region. Medium reversible perfusion abnormality of moderate intensity in the inferior region. Medium reversible perfusion abnormality of moderate intensity in the inferior lateral region. The left ventricular cavity is noted to be normal on the stress study. The left ventricular ejection fraction was calculated to be 51% and left ventricular global function is normal. The study quality is good.      Pre-op Assessment    I have reviewed the Patient Summary Reports.     I have reviewed the Nursing Notes. I have reviewed the NPO Status.   I have reviewed the Medications.     Review of Systems  Cardiovascular:     Hypertension                                    Hypertension         Pulmonary:        Sleep Apnea     Obstructive Sleep Apnea (BRENNAN).               Physical Exam  General: Well nourished, Cooperative, Alert and  Oriented    Airway:  Mallampati: III / III  Mouth Opening: Small, but > 3cm  TM Distance: Normal  Tongue: Normal  Neck ROM: Extension Decreased, Left Lateral Motion Decreased, Right Lateral Motion Decreased    Dental:  Intact        Anesthesia Plan  Type of Anesthesia, risks & benefits discussed:    Anesthesia Type: Gen ETT  Intra-op Monitoring Plan: Standard ASA Monitors, Art Line, Central Line, KENNETH and CO  Post Op Pain Control Plan: multimodal analgesia and IV/PO Opioids PRN  Induction:  IV  Airway Plan: Direct, Post-Induction  Informed Consent: Informed consent signed with the Patient and all parties understand the risks and agree with anesthesia plan.  All questions answered. Patient consented to blood products? Yes  ASA Score: 3    Ready For Surgery From Anesthesia Perspective.     .    Will require video laryngoscopy.

## 2025-01-10 NOTE — PLAN OF CARE
Problem: Adult Inpatient Plan of Care  Goal: Plan of Care Review  1/10/2025 1307 by Prudence Samano RN  Outcome: Progressing  1/10/2025 1307 by Prudence Samano RN  Outcome: Progressing  Goal: Patient-Specific Goal (Individualized)  1/10/2025 1307 by Prudence Samano RN  Outcome: Progressing  1/10/2025 1307 by Prudence Samano RN  Outcome: Progressing  Goal: Absence of Hospital-Acquired Illness or Injury  1/10/2025 1307 by Prudence Samano RN  Outcome: Progressing  1/10/2025 1307 by Prudence Samano RN  Outcome: Progressing  Goal: Optimal Comfort and Wellbeing  1/10/2025 1307 by Prudence Samano RN  Outcome: Progressing  1/10/2025 1307 by Prudence Samano RN  Outcome: Progressing  Goal: Readiness for Transition of Care  1/10/2025 1307 by Prudence Samano RN  Outcome: Progressing  1/10/2025 1307 by Prudence Samano RN  Outcome: Progressing     Problem: Infection  Goal: Absence of Infection Signs and Symptoms  1/10/2025 1307 by Prudence Samano RN  Outcome: Progressing  1/10/2025 1307 by Prudence Samano RN  Outcome: Progressing     Problem: Wound  Goal: Optimal Coping  1/10/2025 1307 by Prudence Samano RN  Outcome: Progressing  1/10/2025 1307 by Prudence Samano RN  Outcome: Progressing  Goal: Optimal Functional Ability  1/10/2025 1307 by Prudence Samano RN  Outcome: Progressing  1/10/2025 1307 by Prudence Samano RN  Outcome: Progressing  Goal: Absence of Infection Signs and Symptoms  1/10/2025 1307 by Prudence Samano RN  Outcome: Progressing  1/10/2025 1307 by Prudence Samano RN  Outcome: Progressing  Goal: Improved Oral Intake  1/10/2025 1307 by Prudence Samano RN  Outcome: Progressing  1/10/2025 1307 by Prudence Samano RN  Outcome: Progressing  Goal: Optimal Pain Control and Function  1/10/2025 1307 by Prudence Samano RN  Outcome: Progressing  1/10/2025 1307 by Prudence Samano RN  Outcome: Progressing  Goal: Skin Health and Integrity  1/10/2025 1307 by Prudence Samano RN  Outcome:  Progressing  1/10/2025 1307 by Prudence Samano, RN  Outcome: Progressing  Goal: Optimal Wound Healing  1/10/2025 1307 by Prudence Samano, RN  Outcome: Progressing  1/10/2025 1307 by Prudence Samano, RN  Outcome: Progressing

## 2025-01-10 NOTE — TRANSFER OF CARE
"Anesthesia Transfer of Care Note    Patient: Benito PEARL Dumesnil III    Procedure(s) Performed: Procedure(s) (LRB):  CORONARY ARTERY BYPASS GRAFT (CABG) (N/A)  SURGICAL PROCUREMENT, VEIN, ENDOSCOPIC (N/A)  EXCLUSION, LEFT ATRIAL APPENDAGE (N/A)    Patient location: ICU    Anesthesia Type: general    Transport from OR: Upon arrival to PACU/ICU, patient attached to ventilator and auscultated to confirm bilateral breath sounds and adequate TV. Transported from OR intubated on 100% O2 by AMBU with assisted ventilation. Continuous ECG monitoring in transport. Continuous SpO2 monitoring in transport. Continuos invasive BP monitoring in transport. Continuous CVP monitoring in transport    Post pain: adequate analgesia    Post assessment: no apparent anesthetic complications and tolerated procedure well    Post vital signs: stable    Level of consciousness: sedated    Nausea/Vomiting: no nausea/vomiting    Complications: none    Transfer of care protocol was followed      Last vitals: Visit Vitals  BP (!) 144/64 (BP Location: Right arm, Patient Position: Lying)   Pulse (!) 53   Temp 36.6 °C (97.8 °F) (Oral)   Resp 18   Ht 5' 7" (1.702 m)   Wt 95.5 kg (210 lb 8.6 oz)   SpO2 100%   BMI 32.98 kg/m²     "

## 2025-01-10 NOTE — NURSING
Pt with high chest tube output post op, Annabel BOURGEOIS made aware, 2 plts ordered, pt sedated s/t fighting vent in light of bleeding. Pt able to nod appropriately, follows commands equally in all extremities. Will continue to monitor and keep providers aware of condition

## 2025-01-10 NOTE — PROGRESS NOTES
[facility] *    Cardiology  Progress Note    Patient Name: Benito Montague III  MRN: [id number]  Admission Date: 1/8/2025  Hospital Length of Stay: 2 days  Code Status: Full Code   Attending Physician: Al Hallman MD   Primary Care Physician: Lew Sullivan MD  Expected Discharge Date:   Principal Problem:<principal problem not specified>    Subjective:     Brief HPI/Hospital Course: Mr. Montague is a 63 y/o male with a history of CAD, HTN, DM II, HLD, BRENNAN, who is known to [facility], Dr. Painting. He presented to the [facility] for a scheduled LHC after he was found to have an abnormal EKG and Stress Test. He underwent a LHC on 1.8.24 which revealed Multi-vessel CAD. He was then transferred to [facility] for a CABG Evaluation. No labs available to review. [facility] has been consulted for CAD management.     1.10.25: Intubated. VSS. SB on telemetry.  Plts 66      PMH: CAD, HTN, DM II, HLD, BRENNAN, Essential Tremor, Obesity  PSH: Colonoscopy, ACDF    Family History: Father - HTN, MI, CAD, HLD; Mother - HTN, MI, CAD, HLD, DM II; Sister - Cancer   Social History: Reports alcohol use. Denies illicit drug use and smoking.      Previous Cardiac Diagnostics:   CABG (1.10.25):  Coronary artery bypass grafting X 3 ,   Left internal mammary artery to the LAD   Saphenous venous graft to   OM2  Saphenous venous graft to    PDA  Ligation OF FABY  Endoscopic venous harvesting left greater saphenous vein    ECHO (1.9.25):  Left Ventricle: The left ventricle is normal in size. Increased wall thickness. There is low normal systolic function with a visually estimated ejection fraction of 50 - 55%. There is normal diastolic function. Right Ventricle: Right ventricle was not well visualized due to poor acoustic window. Aortic Valve: There is no stenosis. There is no significant regurgitation. Mitral Valve: There is no significant regurgitation. Tricuspid Valve: There is trace regurgitation. IVC/SVC: Normal venous pressure at 3 mmHg.  Pericardium: There is no pericardial effusion.    Carotid US (1.9.25):  The right internal carotid artery is patent with less than 50% stenosis.  The left internal carotid artery is patent with less than 50% stenosis.  Bilateral vertebral arteries are patent with antegrade flow.    LHC (1.8.25):  RCA:  of the mid RCA. The distal vessel fills via collaterals from the left. Left main: Severe distal LM disease 60-70% eccentric, calcified lesion. LCX: gives rise to 2 small and 2 large OM branches. Of the 2 larger branches the upper has 40-50% stenosis while the lower has a 60-70% stenosis. LAD: mild disease. Gives rise to 2 large diagonal branches.      LVEDP: 15 mmHg  Ao-LV Gradient: 0 mmHg     MPI (1.3.25):  This is an abnormal perfusion study. This scan is suggestive of moderate risk for future cardiovascular events. Medium reversible perfusion abnormality of moderate intensity in the inferior septal region. Medium reversible perfusion abnormality of moderate intensity in the inferior region. Medium reversible perfusion abnormality of moderate intensity in the inferior lateral region. The left ventricular cavity is noted to be normal on the stress study. The left ventricular ejection fraction was calculated to be 51% and left ventricular global function is normal. The study quality is good.     Review of Systems   Unable to perform ROS: Intubated       Objective:     Vital Signs (Most Recent):  Temp: 97.8 °F (36.6 °C) (01/10/25 0742)  Pulse: (!) 53 (01/10/25 0908)  Resp: 18 (01/10/25 0908)  BP: (!) 144/64 (01/10/25 0908)  SpO2: 100 % (RA) (01/10/25 0908) Vital Signs (24h Range):  Temp:  [97.4 °F (36.3 °C)-98.4 °F (36.9 °C)] 97.8 °F (36.6 °C)  Pulse:  [50-57] 53  Resp:  [18-19] 18  SpO2:  [98 %-100 %] 100 %  BP: (114-144)/(58-70) 144/64   Weight: 95.5 kg (210 lb 8.6 oz)  Body mass index is 32.98 kg/m².  SpO2: 100 % (RA)       Intake/Output Summary (Last 24 hours) at 1/10/2025 1239  Last data filed at 1/9/2025  1348  Gross per 24 hour   Intake 720 ml   Output --   Net 720 ml     Lines/Drains/Airways       Central Venous Catheter Line  Duration             Percutaneous Central Line - Double Lumen  01/10/25 1132 Internal Jugular Right <1 day              Drain  Duration                  Urethral Catheter 01/10/25 1101 Silicone;Temperature probe 16 Fr. <1 day              Airway  Duration                  Airway - Non-Surgical 01/10/25 1055 <1 day              Arterial Line  Duration             Arterial Line 01/10/25 0937 Left Radial <1 day              Peripheral Intravenous Line  Duration                  Peripheral IV - Single Lumen 20 G Left Antecubital -- days                    Significant Labs:   Chemistries:   Recent Labs   Lab 01/09/25  0843 01/10/25  0423    139   K 4.9 4.0    107   CO2 24 26   BUN 11.6 12.8   CREATININE 1.14 0.97   CALCIUM 9.3 8.8   BILITOT 0.7 0.6   ALKPHOS 43 41   ALT 20 18   AST 18 12   GLUCOSE 173* 97   MG 2.20 2.30        CBC/Anemia Labs: Coags:    Recent Labs   Lab 01/09/25  0843 01/10/25  0423   WBC 7.41 6.51   HGB 15.1 14.5   HCT 44.5 43.0   * 136   MCV 90.1 89.4   RDW 12.9 12.8    Recent Labs   Lab 01/09/25  1714   INR 1.0   APTT 29.2        Telemetry: SB     Physical Exam  Vitals and nursing note reviewed.   Constitutional:       Comments: Vented    HENT:      Head: Normocephalic.      Mouth/Throat:      Mouth: Mucous membranes are dry.   Eyes:      Extraocular Movements: Extraocular movements intact.   Cardiovascular:      Rate and Rhythm: Regular rhythm. Bradycardia present.      Pulses: Normal pulses.   Pulmonary:      Effort: Pulmonary effort is normal.      Comments: Oxygen Concentration (%):  [60] 60      Abdominal:      Palpations: Abdomen is soft.   Genitourinary:     Comments: + Tony  Musculoskeletal:         General: Normal range of motion.      Cervical back: Normal range of motion.      Comments: Ct x2   Skin:     General: Skin is warm.      Comments:  Midline Incision C/D/I   Neurological:      Comments: Vented          Current Schedule Inpatient Medications:   amLODIPine  5 mg Oral Daily    aspirin  81 mg Oral Daily    atorvastatin  40 mg Oral Daily    lisinopriL  40 mg Oral Daily    mupirocin   Nasal BID     Continuous Infusions:      Assessment:   CAD    - s/p CABG x3 (1.10.25): LIMA to LAD, SV to OM2, SV to PDA with Ligation of FABY    - LHC (1.8.25): severe multivessel CAD including distal left main and  of the RCA    - EF 50-55% on ECHO (1.9.25)  Acute Hypoxemic Respiratory Failure requiring Mechanical Ventilation   Bradycardia  Thrombocytopenia - Worsening   HTN  DM II  HLD  BRENNAN  Essential Tremor  Obesity  No known history of GI Bleed    Plan:   ECHO and Carotid US Reviewed   Continue ASA, Statin, Lisinopril, and Amlodipine   Wean Vent as tolerated  Chest Tube management per CV surgery Team   Keep Mag > 2 and Potassium > 4  Labs: CBC, CMP, and Mag    MARY Prince  Cardiology  [facility]    I agree with the findings of the complexity of problems addressed and take responsibility for the plan's risks and complications. I approved the plan documented by Kayli Gasca NP.

## 2025-01-11 LAB
ALBUMIN SERPL-MCNC: 3.4 G/DL (ref 3.4–4.8)
ALBUMIN/GLOB SERPL: 2.6 RATIO (ref 1.1–2)
ALP SERPL-CCNC: 35 UNIT/L (ref 40–150)
ALT SERPL-CCNC: 17 UNIT/L (ref 0–55)
ANION GAP SERPL CALC-SCNC: 4 MEQ/L
AST SERPL-CCNC: 24 UNIT/L (ref 5–34)
BASE EXCESS BLD CALC-SCNC: -0.4 MMOL/L (ref -2–2)
BASOPHILS # BLD AUTO: 0.05 X10(3)/MCL
BASOPHILS NFR BLD AUTO: 0.5 %
BILIRUB SERPL-MCNC: 1.1 MG/DL
BLOOD GAS SAMPLE TYPE (OHS): ABNORMAL
BUN SERPL-MCNC: 9.7 MG/DL (ref 8.4–25.7)
CA-I BLD-SCNC: 1.11 MMOL/L (ref 1.12–1.23)
CALCIUM SERPL-MCNC: 8 MG/DL (ref 8.8–10)
CHLORIDE SERPL-SCNC: 110 MMOL/L (ref 98–107)
CO2 BLDA-SCNC: 23.8 MMOL/L
CO2 SERPL-SCNC: 25 MMOL/L (ref 23–31)
CREAT SERPL-MCNC: 0.89 MG/DL (ref 0.72–1.25)
CREAT/UREA NIT SERPL: 11
DRAWN BY BLOOD GAS (OHS): ABNORMAL
EOSINOPHIL # BLD AUTO: 0.04 X10(3)/MCL (ref 0–0.9)
EOSINOPHIL NFR BLD AUTO: 0.4 %
ERYTHROCYTE [DISTWIDTH] IN BLOOD BY AUTOMATED COUNT: 13.6 % (ref 11.5–17)
GFR SERPLBLD CREATININE-BSD FMLA CKD-EPI: >60 ML/MIN/1.73/M2
GLOBULIN SER-MCNC: 1.3 GM/DL (ref 2.4–3.5)
GLUCOSE SERPL-MCNC: 143 MG/DL (ref 82–115)
HCO3 BLDA-SCNC: 22.8 MMOL/L (ref 22–26)
HCT VFR BLD AUTO: 34.2 % (ref 42–52)
HGB BLD-MCNC: 11.3 G/DL (ref 14–18)
IMM GRANULOCYTES # BLD AUTO: 0.03 X10(3)/MCL (ref 0–0.04)
IMM GRANULOCYTES NFR BLD AUTO: 0.3 %
LYMPHOCYTES # BLD AUTO: 1.5 X10(3)/MCL (ref 0.6–4.6)
LYMPHOCYTES NFR BLD AUTO: 13.7 %
MAGNESIUM SERPL-MCNC: 1.9 MG/DL (ref 1.6–2.6)
MCH RBC QN AUTO: 30.3 PG (ref 27–31)
MCHC RBC AUTO-ENTMCNC: 33 G/DL (ref 33–36)
MCV RBC AUTO: 91.7 FL (ref 80–94)
MONOCYTES # BLD AUTO: 1.5 X10(3)/MCL (ref 0.1–1.3)
MONOCYTES NFR BLD AUTO: 13.7 %
NEUTROPHILS # BLD AUTO: 7.82 X10(3)/MCL (ref 2.1–9.2)
NEUTROPHILS NFR BLD AUTO: 71.4 %
NRBC BLD AUTO-RTO: 0 %
PCO2 BLDA: 32 MMHG (ref 35–45)
PH BLDA: 7.46 [PH] (ref 7.35–7.45)
PHOSPHATE SERPL-MCNC: 1.8 MG/DL (ref 2.3–4.7)
PLATELET # BLD AUTO: 162 X10(3)/MCL (ref 130–400)
PMV BLD AUTO: 11.4 FL (ref 7.4–10.4)
PO2 BLDA: 152 MMHG (ref 80–100)
POCT GLUCOSE: 110 MG/DL (ref 70–110)
POCT GLUCOSE: 110 MG/DL (ref 70–110)
POCT GLUCOSE: 112 MG/DL (ref 70–110)
POCT GLUCOSE: 114 MG/DL (ref 70–110)
POCT GLUCOSE: 116 MG/DL (ref 70–110)
POCT GLUCOSE: 118 MG/DL (ref 70–110)
POCT GLUCOSE: 128 MG/DL (ref 70–110)
POCT GLUCOSE: 128 MG/DL (ref 70–110)
POCT GLUCOSE: 129 MG/DL (ref 70–110)
POCT GLUCOSE: 130 MG/DL (ref 70–110)
POCT GLUCOSE: 130 MG/DL (ref 70–110)
POCT GLUCOSE: 134 MG/DL (ref 70–110)
POCT GLUCOSE: 139 MG/DL (ref 70–110)
POCT GLUCOSE: 147 MG/DL (ref 70–110)
POCT GLUCOSE: 150 MG/DL (ref 70–110)
POCT GLUCOSE: 152 MG/DL (ref 70–110)
POCT GLUCOSE: 160 MG/DL (ref 70–110)
POCT GLUCOSE: 163 MG/DL (ref 70–110)
POCT GLUCOSE: 186 MG/DL (ref 70–110)
POCT GLUCOSE: 188 MG/DL (ref 70–110)
POCT GLUCOSE: 99 MG/DL (ref 70–110)
POTASSIUM BLOOD GAS (OHS): 4.2 MMOL/L (ref 3.5–5)
POTASSIUM SERPL-SCNC: 4.1 MMOL/L (ref 3.5–5.1)
PROT SERPL-MCNC: 4.7 GM/DL (ref 5.8–7.6)
RBC # BLD AUTO: 3.73 X10(6)/MCL (ref 4.7–6.1)
SAMPLE SITE BLOOD GAS (OHS): ABNORMAL
SAO2 % BLDA: 99 %
SODIUM BLOOD GAS (OHS): 135 MMOL/L (ref 137–145)
SODIUM SERPL-SCNC: 139 MMOL/L (ref 136–145)
WBC # BLD AUTO: 10.94 X10(3)/MCL (ref 4.5–11.5)

## 2025-01-11 PROCEDURE — 94760 N-INVAS EAR/PLS OXIMETRY 1: CPT

## 2025-01-11 PROCEDURE — 51798 US URINE CAPACITY MEASURE: CPT

## 2025-01-11 PROCEDURE — 63600175 PHARM REV CODE 636 W HCPCS: Performed by: THORACIC SURGERY (CARDIOTHORACIC VASCULAR SURGERY)

## 2025-01-11 PROCEDURE — 80053 COMPREHEN METABOLIC PANEL: CPT | Performed by: THORACIC SURGERY (CARDIOTHORACIC VASCULAR SURGERY)

## 2025-01-11 PROCEDURE — 25000003 PHARM REV CODE 250: Performed by: PHYSICIAN ASSISTANT

## 2025-01-11 PROCEDURE — 21400001 HC TELEMETRY ROOM

## 2025-01-11 PROCEDURE — 25000003 PHARM REV CODE 250

## 2025-01-11 PROCEDURE — 83735 ASSAY OF MAGNESIUM: CPT | Performed by: THORACIC SURGERY (CARDIOTHORACIC VASCULAR SURGERY)

## 2025-01-11 PROCEDURE — 97110 THERAPEUTIC EXERCISES: CPT

## 2025-01-11 PROCEDURE — 63600175 PHARM REV CODE 636 W HCPCS: Performed by: PHYSICIAN ASSISTANT

## 2025-01-11 PROCEDURE — 99900035 HC TECH TIME PER 15 MIN (STAT)

## 2025-01-11 PROCEDURE — 99900031 HC PATIENT EDUCATION (STAT)

## 2025-01-11 PROCEDURE — 84100 ASSAY OF PHOSPHORUS: CPT | Performed by: THORACIC SURGERY (CARDIOTHORACIC VASCULAR SURGERY)

## 2025-01-11 PROCEDURE — 27100171 HC OXYGEN HIGH FLOW UP TO 24 HOURS

## 2025-01-11 PROCEDURE — 94003 VENT MGMT INPAT SUBQ DAY: CPT

## 2025-01-11 RX ORDER — IBUPROFEN 200 MG
24 TABLET ORAL
Status: DISCONTINUED | OUTPATIENT
Start: 2025-01-11 | End: 2025-01-15 | Stop reason: HOSPADM

## 2025-01-11 RX ORDER — IBUPROFEN 200 MG
16 TABLET ORAL
Status: DISCONTINUED | OUTPATIENT
Start: 2025-01-11 | End: 2025-01-15 | Stop reason: HOSPADM

## 2025-01-11 RX ORDER — GLUCAGON 1 MG
1 KIT INJECTION
Status: DISCONTINUED | OUTPATIENT
Start: 2025-01-11 | End: 2025-01-15 | Stop reason: HOSPADM

## 2025-01-11 RX ORDER — INSULIN ASPART 100 [IU]/ML
0-10 INJECTION, SOLUTION INTRAVENOUS; SUBCUTANEOUS
Status: DISCONTINUED | OUTPATIENT
Start: 2025-01-11 | End: 2025-01-15 | Stop reason: HOSPADM

## 2025-01-11 RX ORDER — SODIUM,POTASSIUM PHOSPHATES 280-250MG
1 POWDER IN PACKET (EA) ORAL ONCE
Status: COMPLETED | OUTPATIENT
Start: 2025-01-11 | End: 2025-01-11

## 2025-01-11 RX ADMIN — OXYCODONE HYDROCHLORIDE 10 MG: 10 TABLET ORAL at 06:01

## 2025-01-11 RX ADMIN — ATORVASTATIN CALCIUM 40 MG: 40 TABLET, FILM COATED ORAL at 09:01

## 2025-01-11 RX ADMIN — DOCUSATE SODIUM 100 MG: 100 CAPSULE, LIQUID FILLED ORAL at 09:01

## 2025-01-11 RX ADMIN — KETOROLAC TROMETHAMINE 30 MG: 30 INJECTION, SOLUTION INTRAMUSCULAR; INTRAVENOUS at 06:01

## 2025-01-11 RX ADMIN — ENOXAPARIN SODIUM 40 MG: 40 INJECTION SUBCUTANEOUS at 06:01

## 2025-01-11 RX ADMIN — MUPIROCIN: 20 OINTMENT TOPICAL at 12:01

## 2025-01-11 RX ADMIN — Medication 81 MG: at 09:01

## 2025-01-11 RX ADMIN — POTASSIUM & SODIUM PHOSPHATES POWDER PACK 280-160-250 MG 1 PACKET: 280-160-250 PACK at 01:01

## 2025-01-11 RX ADMIN — ACETAMINOPHEN 1000 MG: 10 INJECTION, SOLUTION INTRAVENOUS at 01:01

## 2025-01-11 RX ADMIN — SUCRALFATE 1 G: 1 TABLET ORAL at 06:01

## 2025-01-11 RX ADMIN — SUCRALFATE 1 G: 1 TABLET ORAL at 10:01

## 2025-01-11 RX ADMIN — DOCUSATE SODIUM 100 MG: 100 CAPSULE, LIQUID FILLED ORAL at 08:01

## 2025-01-11 RX ADMIN — SODIUM PHOSPHATE, MONOBASIC, MONOHYDRATE AND SODIUM PHOSPHATE, DIBASIC, ANHYDROUS 20.01 MMOL: 142; 276 INJECTION, SOLUTION INTRAVENOUS at 02:01

## 2025-01-11 RX ADMIN — MORPHINE SULFATE 4 MG: 4 INJECTION, SOLUTION INTRAMUSCULAR; INTRAVENOUS at 02:01

## 2025-01-11 RX ADMIN — OXYCODONE HYDROCHLORIDE 10 MG: 10 TABLET ORAL at 10:01

## 2025-01-11 RX ADMIN — FOLIC ACID 1 MG: 1 TABLET ORAL at 09:01

## 2025-01-11 RX ADMIN — KETOROLAC TROMETHAMINE 30 MG: 30 INJECTION, SOLUTION INTRAMUSCULAR; INTRAVENOUS at 04:01

## 2025-01-11 RX ADMIN — VANCOMYCIN HYDROCHLORIDE 1000 MG: 1 INJECTION, POWDER, LYOPHILIZED, FOR SOLUTION INTRAVENOUS at 10:01

## 2025-01-11 RX ADMIN — FAMOTIDINE 20 MG: 10 INJECTION, SOLUTION INTRAVENOUS at 09:01

## 2025-01-11 NOTE — PROGRESS NOTES
[facility] - [facility]  Pulmonary Critical Care Note    Patient Name: Benito Montague III  MRN: [id number]  Admission Date: 1/8/2025  Hospital Length of Stay: 3 days  Code Status: Full Code  Attending Provider: Al Hallman MD  Primary Care Provider: Lew Sullivan MD     Subjective:     HPI:   Benito Montague III is a 64 y.o. male with PMH of hypertension, essential tremor, sleep apnea, who underwent a three-vessel CABG with left atrial appendage ligation ton 1/10/2025. There were no reported intraoperative complications. Patient was seen postoperatively in the ICU still under intraoperative sedation on mechanical ventilatory support.     Hospital Course/Significant events:  1/10/2025 - Admitted to ICU    24 Hour Interval History:  NAEON. /54. Other vitals WNL. Corrected Ca 8.5, P 1.8 and will replete with 1 potassium/sodium phosphate (280-160-250 mg) 1 packet. Plan to downgrade patient today. Denies HA, blurred vision, CP, SOB, abdominal pain, anxiety/depression.      Past Medical History:   Diagnosis Date    Essential (primary) hypertension     Essential tremor     Seasonal allergies     Sleep apnea, unspecified        Social History     Socioeconomic History    Marital status:    Tobacco Use    Smoking status: Never    Smokeless tobacco: Never   Substance and Sexual Activity    Alcohol use: Yes    Drug use: Never     Social Drivers of Health     Financial Resource Strain: Patient Declined (1/8/2025)    Overall Financial Resource Strain (CARDIA)     Difficulty of Paying Living Expenses: Patient declined   Food Insecurity: No Food Insecurity (1/9/2025)    Hunger Vital Sign     Worried About Running Out of Food in the Last Year: Never true     Ran Out of Food in the Last Year: Never true   Transportation Needs: No Transportation Needs (1/9/2025)    TRANSPORTATION NEEDS     Transportation : No   Stress: Patient Declined (1/8/2025)    [employer] -  Occupational Stress Questionnaire     Feeling of Stress : Patient declined   Housing Stability: Low Risk  (1/9/2025)    Housing Stability Vital Sign     Unable to Pay for Housing in the Last Year: No     Homeless in the Last Year: No           Objective:     Current Outpatient Medications   Medication Instructions    amLODIPine (NORVASC) 5 mg, Oral, Daily    cyanocobalamin, vitamin B-12, (VITAMIN B-12 ORAL)   0 Refill(s)    glucosamine HCl/chondroitin quinn (GLUCOSAMINE-CHONDROITIN ORAL) Oral    lisinopriL 40 mg, Oral, Daily    montelukast (SINGULAIR) 10 mg, Oral    pravastatin (PRAVACHOL) 40 mg, Oral, Nightly    propranoloL (INDERAL) 10 mg, Oral    RED YEAST RICE ORAL 1,200 mg, Oral       Current Inpatient Medications   acetaminophen  1,000 mg Intravenous Q8H    amLODIPine  5 mg Oral Daily    aspirin  81 mg Oral Daily    atorvastatin  40 mg Oral Daily    docusate sodium  100 mg Oral BID    enoxparin  40 mg Subcutaneous Daily    famotidine (PF)  20 mg Intravenous Daily    folic acid  1 mg Oral Daily    lisinopriL  40 mg Oral Daily    metoprolol tartrate  12.5 mg Oral BID    mupirocin   Nasal BID    sucralfate  1 g Oral QID (AC & HS)    vancomycin (VANCOCIN) 1,000 mg in 0.9% NaCl 250 mL IVPB (admixture device)  1,000 mg Intravenous Q12H       ROS  Pertinent positives and negatives listed in HPI. All other systems are reviewed and are negative.      Intake/Output Summary (Last 24 hours) at 1/11/2025 1141  Last data filed at 1/11/2025 1113  Gross per 24 hour   Intake 3446.54 ml   Output 3065 ml   Net 381.54 ml           Vital Signs (Most Recent):  Temp: (!) 100.9 °F (38.3 °C) (01/11/25 0500)  Pulse: 64 (01/11/25 1100)  Resp: 18 (01/11/25 1100)  BP: (!) 120/52 (01/11/25 1100)  SpO2: 100 % (01/11/25 1100)  Body mass index is 32.98 kg/m².  Weight: 95.5 kg (210 lb 8.6 oz) Vital Signs (24h Range):  Temp:  [95.5 °F (35.3 °C)-101.5 °F (38.6 °C)] 100.9 °F (38.3 °C)  Pulse:  [53-85] 64  Resp:  [1-31] 18  SpO2:  [95 %-100 %] 100  %  BP: ()/(42-90) 120/52     Physical Exam:    GEN: NAD  HEENT: Percutaneous central line on right neck w/o signs of infection.   RESP: CTAB  CV: RRR, S1/S2, no murmur, no peripheral edema  ABD: BS+, no tenderness  EXT: Full range of motion of all extremities; no joint deformities or edema.   NEURO: AAO x3  SKIN: Dry and warm  PSYCH: Calm, cooperative.  Mood and effect normal.  Responses appropriate.         Lines/Drains/Airways       Central Venous Catheter Line  Duration             Percutaneous Central Line - Double Lumen  01/10/25 1132 Internal Jugular Right 1 day              Drain  Duration                  Chest Tube 01/10/25 1320 Tube - 1 Mediastinal 28 Fr. <1 day         Chest Tube 01/10/25 1321 Left Pericardial 28 Fr. <1 day              Airway  Duration                  Airway - Non-Surgical 01/10/25 1055 1 day              Line  Duration                  Pacer Wires 01/10/25 1330 <1 day              Peripheral Intravenous Line  Duration                  Peripheral IV - Single Lumen 20 G Left Antecubital -- days                    Significant Labs:    CBC:  Recent Labs   Lab 01/10/25  1343 01/10/25  1646 01/10/25  2351   WBC 12.37* 11.50 10.94   ABSNEUTRO 8.51 8.75 7.82   RBC 3.71* 3.26* 3.73*   HGB 11.6* 10.1* 11.3*   HCT 34.0* 29.7* 34.2*   PLT 66* 155 162   MCV 91.6 91.1 91.7   RDW 12.8 12.8 13.6        BMP:  Recent Labs   Lab 01/10/25  1343 01/10/25  1646 01/11/25  0053    138 139   K 4.4 3.6 4.1   * 109* 110*   CO2 22* 21* 25   BUN 9.9 10.1 9.7   CREATININE 0.83 0.90 0.89   CALCIUM 10.1* 8.6* 8.0*       Electrolytes:  Recent Labs   Lab 01/09/25  0843 01/10/25  0423 01/10/25  1343 01/10/25  1646 01/11/25  0053    139 138 138 139   K 4.9 4.0 4.4 3.6 4.1   CALCIUM 9.3 8.8 10.1* 8.6* 8.0*   MG 2.20 2.30  --   --  1.90   PHOS  --   --   --   --  1.8*       ABG:  Recent Labs   Lab 01/10/25  1337 01/10/25  1605 01/11/25  0638   PH 7.429   < > 7.460*   PO2 272*   < > 152.0*   PCO2  32.7*   < > 32.0*   HCO3 21.7*   < > 22.8   BE -3*  --   --     < > = values in this interval not displayed.           Significant Imaging:  I have reviewed all pertinent imaging within the past 24 hours.        Assessment/Plan:     Assessment  POD 1 s/p CABG x 3 with left atrial appendage ligation   Hypertension   Obstructive sleep apnea    Plan  Continue post-CABG protocol per CV surgery  Continue home medications when appropriate   Continue home CPAP and/or oxygen if necessary q.h.s. and with naps  Anticoagulant & antiplatelet per CV surgery  CTM lytes and replace as needed  Plan to downgrade today    DVT Prophylaxis: Lovenox  Diet: Clear liquid (no sugar)/Bariatric  GI Prophylaxis: Sucralfate     Greater than 30 minutes of critical care was time spent personally by me on the following activities: development of treatment plan with patient or surrogate and bedside caregivers, discussions with consultants, evaluation of patient's response to treatment, examination of patient, ordering and performing treatments and interventions, ordering and review of laboratory studies, ordering and review of radiographic studies, pulse oximetry, re-evaluation of patient's condition.  This critical care time did not overlap with that of any other provider or involve time for any procedures.     Kenji Kwok MD  Pulmonary Critical Care Medicine  [facility] - [facility]

## 2025-01-11 NOTE — PROGRESS NOTES
01/11/25 1525   Pre Exercise Vitals   /53   Pulse 69   Supplemental O2? No   SpO2 98 %   During Exercise Vitals   Pulse 82   Supplemental O2? No   SpO2 97 %   Distance Walked 30 feet   Post Exercise Vitals   /50   Pulse 79   Supplemental O2? No   SpO2 99 %   Modality   Modality   (rollator)     Communicated with nurse prior to and post activity.   Up in chair pre and post activity.   CT x2 cont.  Mod assist x2 attempts sit to stand maintaining sternal precautions.   Decreased step length noted--pt states normal for him,  Brief standing rest breaks x3 due to fatigue.  Encouraged increased activity and use of IS.

## 2025-01-11 NOTE — PLAN OF CARE
Problem: Adult Inpatient Plan of Care  Goal: Plan of Care Review  1/11/2025 1750 by Bertha Guevara RN  Outcome: Progressing  1/11/2025 1116 by Bertha Guevara RN  Outcome: Progressing  Goal: Patient-Specific Goal (Individualized)  1/11/2025 1750 by Bertha Guevara RN  Outcome: Progressing  1/11/2025 1116 by Bertha Guevara RN  Outcome: Progressing  Goal: Absence of Hospital-Acquired Illness or Injury  1/11/2025 1750 by Bertha Guevara RN  Outcome: Progressing  1/11/2025 1116 by Bertha Guevara RN  Outcome: Progressing  Goal: Optimal Comfort and Wellbeing  1/11/2025 1750 by Bertha Guevara RN  Outcome: Progressing  1/11/2025 1116 by Bertha Guevara RN  Outcome: Progressing  Goal: Readiness for Transition of Care  1/11/2025 1750 by Bertha Guevara RN  Outcome: Progressing  1/11/2025 1116 by Bertha Guevara RN  Outcome: Progressing     Problem: Infection  Goal: Absence of Infection Signs and Symptoms  1/11/2025 1750 by Bertha Guevara RN  Outcome: Progressing  1/11/2025 1116 by Bertha Guevara RN  Outcome: Progressing     Problem: Wound  Goal: Optimal Coping  Outcome: Progressing

## 2025-01-11 NOTE — PROGRESS NOTES
"[facility] *    Cardiology  Progress Note    Patient Name: Benito Montague III  MRN: [id number]  Admission Date: 1/8/2025  Hospital Length of Stay: 3 days  Code Status: Full Code   Attending Physician: Al Hallman MD   Primary Care Physician: Lew Sullivan MD  Expected Discharge Date:   Principal Problem:<principal problem not specified>    Subjective:     Brief HPI/Hospital Course: Mr. Montague is a 63 y/o male with a history of CAD, HTN, DM II, HLD, BRENNAN, who is known to [facility], Dr. Painting. He presented to the [facility] for a scheduled LHC after he was found to have an abnormal EKG and Stress Test. He underwent a LHC on 1.8.24 which revealed Multi-vessel CAD. He was then transferred to [facility] for a CABG Evaluation. No labs available to review. [facility] has been consulted for CAD management.     1.10.25: Intubated. VSS. SB on telemetry.  Plts 66   1.11.25: NAD. VSS. SB on telemetry. No complaints of SOB/Palps. Reports incisional chest pain. "I'm okay"     PMH: CAD, HTN, DM II, HLD, BRENNAN, Essential Tremor, Obesity  PSH: Colonoscopy, ACDF    Family History: Father - HTN, MI, CAD, HLD; Mother - HTN, MI, CAD, HLD, DM II; Sister - Cancer   Social History: Reports alcohol use. Denies illicit drug use and smoking.      Previous Cardiac Diagnostics:   CABG (1.10.25):  Coronary artery bypass grafting X 3 ,   Left internal mammary artery to the LAD   Saphenous venous graft to   OM2  Saphenous venous graft to    PDA  Ligation OF FABY  Endoscopic venous harvesting left greater saphenous vein    ECHO (1.9.25):  Left Ventricle: The left ventricle is normal in size. Increased wall thickness. There is low normal systolic function with a visually estimated ejection fraction of 50 - 55%. There is normal diastolic function. Right Ventricle: Right ventricle was not well visualized due to poor acoustic window. Aortic Valve: There is no stenosis. There is no significant regurgitation. Mitral Valve: There is no significant " regurgitation. Tricuspid Valve: There is trace regurgitation. IVC/SVC: Normal venous pressure at 3 mmHg. Pericardium: There is no pericardial effusion.    Carotid US (1.9.25):  The right internal carotid artery is patent with less than 50% stenosis.  The left internal carotid artery is patent with less than 50% stenosis.  Bilateral vertebral arteries are patent with antegrade flow.    LHC (1.8.25):  RCA:  of the mid RCA. The distal vessel fills via collaterals from the left. Left main: Severe distal LM disease 60-70% eccentric, calcified lesion. LCX: gives rise to 2 small and 2 large OM branches. Of the 2 larger branches the upper has 40-50% stenosis while the lower has a 60-70% stenosis. LAD: mild disease. Gives rise to 2 large diagonal branches.      LVEDP: 15 mmHg  Ao-LV Gradient: 0 mmHg     MPI (1.3.25):  This is an abnormal perfusion study. This scan is suggestive of moderate risk for future cardiovascular events. Medium reversible perfusion abnormality of moderate intensity in the inferior septal region. Medium reversible perfusion abnormality of moderate intensity in the inferior region. Medium reversible perfusion abnormality of moderate intensity in the inferior lateral region. The left ventricular cavity is noted to be normal on the stress study. The left ventricular ejection fraction was calculated to be 51% and left ventricular global function is normal. The study quality is good.     Review of Systems   Unable to perform ROS: Intubated   Constitutional: Positive for fever.   Cardiovascular:  Positive for chest pain (Incisional). Negative for dyspnea on exertion, leg swelling and palpitations.   Respiratory:  Negative for shortness of breath.    All other systems reviewed and are negative.    Objective:     Vital Signs (Most Recent):  Temp: (!) 100.9 °F (38.3 °C) (01/11/25 0500)  Pulse: 63 (01/11/25 1130)  Resp: 17 (01/11/25 1130)  BP: (!) 128/54 (01/11/25 1130)  SpO2: 100 % (01/11/25 1130) Vital  Signs (24h Range):  Temp:  [95.5 °F (35.3 °C)-101.5 °F (38.6 °C)] 100.9 °F (38.3 °C)  Pulse:  [53-85] 63  Resp:  [1-31] 17  SpO2:  [95 %-100 %] 100 %  BP: ()/(42-90) 128/54   Weight: 95.5 kg (210 lb 8.6 oz)  Body mass index is 32.98 kg/m².  SpO2: 100 %       Intake/Output Summary (Last 24 hours) at 1/11/2025 1146  Last data filed at 1/11/2025 1113  Gross per 24 hour   Intake 3446.54 ml   Output 3065 ml   Net 381.54 ml     Lines/Drains/Airways       Central Venous Catheter Line  Duration             Percutaneous Central Line - Double Lumen  01/10/25 1132 Internal Jugular Right 1 day              Drain  Duration                  Chest Tube 01/10/25 1320 Tube - 1 Mediastinal 28 Fr. <1 day         Chest Tube 01/10/25 1321 Left Pericardial 28 Fr. <1 day              Airway  Duration                  Airway - Non-Surgical 01/10/25 1055 1 day              Line  Duration                  Pacer Wires 01/10/25 1330 <1 day              Peripheral Intravenous Line  Duration                  Peripheral IV - Single Lumen 20 G Left Antecubital -- days                    Significant Labs:   Chemistries:   Recent Labs   Lab 01/09/25  0843 01/10/25  0423 01/10/25  1343 01/10/25  1646 01/11/25  0053    139 138 138 139   K 4.9 4.0 4.4 3.6 4.1    107 110* 109* 110*   CO2 24 26 22* 21* 25   BUN 11.6 12.8 9.9 10.1 9.7   CREATININE 1.14 0.97 0.83 0.90 0.89   CALCIUM 9.3 8.8 10.1* 8.6* 8.0*   BILITOT 0.7 0.6  --   --  1.1   ALKPHOS 43 41  --   --  35*   ALT 20 18  --   --  17   AST 18 12  --   --  24   GLUCOSE 173* 97 163* 205* 143*   MG 2.20 2.30  --   --  1.90   PHOS  --   --   --   --  1.8*        CBC/Anemia Labs: Coags:    Recent Labs   Lab 01/10/25  1343 01/10/25  1646 01/10/25  2351   WBC 12.37* 11.50 10.94   HGB 11.6* 10.1* 11.3*   HCT 34.0* 29.7* 34.2*   PLT 66* 155 162   MCV 91.6 91.1 91.7   RDW 12.8 12.8 13.6    Recent Labs   Lab 01/09/25  1714 01/10/25  1343   INR 1.0 1.5*   APTT 29.2 30.7        Telemetry: SB      Physical Exam  Vitals and nursing note reviewed.   HENT:      Head: Normocephalic.      Mouth/Throat:      Mouth: Mucous membranes are dry.   Eyes:      Extraocular Movements: Extraocular movements intact.   Cardiovascular:      Rate and Rhythm: Normal rate and regular rhythm.      Pulses: Normal pulses.   Pulmonary:      Effort: Pulmonary effort is normal.      Comments:       Abdominal:      Palpations: Abdomen is soft.   Genitourinary:     Comments: + Tony  Musculoskeletal:         General: Normal range of motion.      Cervical back: Normal range of motion.      Comments: Ct x2   Skin:     General: Skin is warm.      Comments: Midline Incision C/D/I   Neurological:      Mental Status: He is alert and oriented to person, place, and time.         Current Schedule Inpatient Medications:   acetaminophen  1,000 mg Intravenous Q8H    amLODIPine  5 mg Oral Daily    aspirin  81 mg Oral Daily    atorvastatin  40 mg Oral Daily    docusate sodium  100 mg Oral BID    enoxparin  40 mg Subcutaneous Daily    famotidine (PF)  20 mg Intravenous Daily    folic acid  1 mg Oral Daily    lisinopriL  40 mg Oral Daily    metoprolol tartrate  12.5 mg Oral BID    mupirocin   Nasal BID    sucralfate  1 g Oral QID (AC & HS)    vancomycin (VANCOCIN) 1,000 mg in 0.9% NaCl 250 mL IVPB (admixture device)  1,000 mg Intravenous Q12H     Continuous Infusions:   D5 and 0.45% NaCl   Intravenous Continuous 100 mL/hr at 01/10/25 1441 New Bag at 01/10/25 1441    insulin regular 1 units/mL infusion orderable (CTS POST-OP)  0-52 Units/hr Intravenous Continuous 2 mL/hr at 01/11/25 0000 2 Units/hr at 01/11/25 0000       Assessment:   CAD    - s/p CABG x3 (1.10.25): LIMA to LAD, SV to OM2, SV to PDA with Ligation of FABY    - LHC (1.8.25): severe multivessel CAD including distal left main and  of the RCA    - EF 50-55% on ECHO (1.9.25)  Acute Hypoxemic Respiratory Failure requiring Mechanical Ventilation - Currently on Nasal Cannula   Fever    Bradycardia  Thrombocytopenia - Worsening   Hypotension requiring Epi -currently stable     - History of HTN  DM II  HLD  BRENNAN  Essential Tremor  Obesity  No known history of GI Bleed    Plan:   Continue ASA and Statin  Hold Lisinopril and Amlodipine for now  No BB secondary to baseline Bradycardia    Chest Tube management per CV surgery Team   Keep Mag > 2 and Potassium > 4  Labs: CBC, CMP, and Mag    MARY Prince  Cardiology  [facility]    I agree with the findings of the complexity of problems addressed and take responsibility for the plan's risks and complications. I approved the plan documented by Kayli Gasca NP.

## 2025-01-11 NOTE — PROGRESS NOTES
[facility] - [facility]  Cardiothoracic Surgery  Progress Note  Patient Name: Benito Montague III  MRN: [id number]  Admission Date: 1/8/2025  Hospital Length of Stay: 3 days  Code Status: Full Code   Attending Physician: Al Hallman MD   Referring Provider: Chadd Painting MD  Principal Problem:<principal problem not specified>    Subjective:     Post-Op Info:  Procedure(s) (LRB):  CORONARY ARTERY BYPASS GRAFT (CABG) (N/A)  SURGICAL PROCUREMENT, VEIN, ENDOSCOPIC (N/A)  EXCLUSION, LEFT ATRIAL APPENDAGE (N/A)   1 Day Post-Op      Interval History: Extubated, off pressors, has no major complaints today, chest tube output of 955ml, urine output of 1.9L    Medications:  Continuous Infusions:   clevidipine  0-32 mg/hr Intravenous Continuous 0 mL/hr at 01/10/25 1647 0 mg/hr at 01/10/25 1647    dexmedeTOMIDine (Precedex) infusion (titrating)  0-1.4 mcg/kg/hr Intravenous Continuous 0 mL/hr at 01/10/25 1648 0 mcg/kg/hr at 01/10/25 1648    D5 and 0.45% NaCl   Intravenous Continuous 100 mL/hr at 01/10/25 1441 New Bag at 01/10/25 1441    EPINEPHrine  0-2 mcg/kg/min Intravenous Continuous   Stopped at 01/11/25 0305    insulin regular 1 units/mL infusion orderable (CTS POST-OP)  0-52 Units/hr Intravenous Continuous 2 mL/hr at 01/11/25 0000 2 Units/hr at 01/11/25 0000    propofoL  0-50 mcg/kg/min Intravenous Continuous   Stopped at 01/11/25 0148     Scheduled Meds:   acetaminophen  1,000 mg Intravenous Q8H    amLODIPine  5 mg Oral Daily    aspirin  81 mg Oral Daily    atorvastatin  40 mg Oral Daily    docusate sodium  100 mg Oral BID    enoxparin  40 mg Subcutaneous Daily    famotidine (PF)  20 mg Intravenous Daily    folic acid  1 mg Oral Daily    lisinopriL  40 mg Oral Daily    metoprolol tartrate  12.5 mg Oral BID    mupirocin   Nasal BID    sucralfate  1 g Oral QID (AC & HS)    vancomycin (VANCOCIN) 1,000 mg in 0.9% NaCl 250 mL IVPB (admixture device)  1,000 mg Intravenous Q12H     PRN Meds:  Current  Facility-Administered Medications:     0.9%  NaCl infusion (for blood administration), , Intravenous, Q24H PRN    0.9%  NaCl infusion (for blood administration), , Intravenous, Q24H PRN    0.9%  NaCl infusion (for blood administration), , Intravenous, Q24H PRN    albumin human 5%, 12.5 g, Intravenous, PRN    calcium gluconate IVPB, 1 g, Intravenous, PRN    calcium gluconate IVPB, 2 g, Intravenous, PRN    calcium gluconate IVPB, 3 g, Intravenous, PRN    dextrose 50%, 12.5 g, Intravenous, PRN    dextrose 50%, 25 g, Intravenous, PRN    HYDROcodone-acetaminophen, 1 tablet, Oral, Q4H PRN    ketorolac, 30 mg, Intravenous, Q6H PRN    lactulose 10 gram/15 ml, 20 g, Oral, Q6H PRN    magnesium sulfate IVPB, 2 g, Intravenous, PRN    magnesium sulfate IVPB, 4 g, Intravenous, PRN    metoclopramide, 5 mg, Intravenous, Q6H PRN    morphine, 4 mg, Intravenous, Q4H PRN    ondansetron, 4 mg, Intravenous, Q4H PRN    oxyCODONE, 10 mg, Oral, Q4H PRN    potassium chloride in water, 20 mEq, Intravenous, PRN    potassium chloride in water, 60 mEq, Intravenous, PRN    potassium chloride in water, 40 mEq, Intravenous, PRN    sodium phosphate 15 mmol in D5W 250 mL IVPB, 15 mmol, Intravenous, PRN    sodium phosphate 20.01 mmol in D5W 250 mL IVPB, 20.01 mmol, Intravenous, PRN    sodium phosphate 30 mmol in D5W 250 mL IVPB, 30 mmol, Intravenous, PRN    vancomycin (VANCOCIN) 1,000 mg in D5W 250 mL IVPB (admixture device), 1,000 mg, Intravenous, On Call Procedure     Objective:     Vital Signs (Most Recent):  Temp: (!) 100.9 °F (38.3 °C) (01/11/25 0500)  Pulse: 63 (01/11/25 0830)  Resp: 16 (01/11/25 1024)  BP: (!) 111/51 (01/11/25 0830)  SpO2: 99 % (01/11/25 0830) Vital Signs (24h Range):  Temp:  [95.5 °F (35.3 °C)-101.5 °F (38.6 °C)] 100.9 °F (38.3 °C)  Pulse:  [53-85] 63  Resp:  [1-31] 16  SpO2:  [95 %-100 %] 99 %  BP: ()/(42-90) 111/51       Intake/Output Summary (Last 24 hours) at 1/11/2025 1030  Last data filed at 1/11/2025 1026  Gross  per 24 hour   Intake 3303.49 ml   Output 3065 ml   Net 238.49 ml       Physical Exam  Gen: NAD  CVS: Midline dressing c/d/I  Resp: Chest tube in place with serosanguinous drainage  Abd: Soft, NT, ND    Significant Labs:  BMP:   Recent Labs   Lab 01/11/25  0053      K 4.1   *   CO2 25   BUN 9.7   CREATININE 0.89   CALCIUM 8.0*   MG 1.90     CBC:   Recent Labs   Lab 01/10/25  2351   WBC 10.94   RBC 3.73*   HGB 11.3*   HCT 34.2*      MCV 91.7   MCH 30.3   MCHC 33.0       Significant Diagnostics:  I have reviewed all pertinent imaging results/findings within the past 24 hours.    Assessment/Plan:   POD 1 s/p CABG x 3  - OOB to chair  - Monitor chest tube output, daily CXR  - continue tubes to suction  - diet as tolerated  - Continue ICU care    There are no hospital problems to display for this patient.      Mingo Chavez MD  Cardiothoracic Surgery  [facility] - [facility]

## 2025-01-11 NOTE — PROCEDURES
"Benito Montague III is a 64 y.o. male patient.    Temp: 98.8 °F (37.1 °C) (01/10/25 1845)  Pulse: 74 (01/10/25 1845)  Resp: (!) 22 (01/10/25 1845)  BP: (!) 114/49 (01/10/25 1845)  SpO2: 100 % (01/10/25 1845)  Weight: 95.5 kg (210 lb 8.6 oz) (01/08/25 2322)  Height: 5' 7" (170.2 cm) (01/08/25 2322)       Arterial Line    Date/Time: 1/10/2025 8:39 PM  Location procedure was performed: [address] INTENSIVE CARE UNIT    Performed by: Tristin Sweeney MD  Authorized by: Tristin Sweeney MD  Consent Done: Not Needed  Preparation: Patient was prepped and draped in the usual sterile fashion.  Indications: hemodynamic monitoring  Location: right radial  Oracio's test normal: yes  Needle gauge: 20  Seldinger technique: Seldinger technique used  Number of attempts: 3  Complications: No  Estimated blood loss (mL): 2  Specimens: No  Implants: No  Post-procedure: dressing applied  Post-procedure CMS: normal  Patient tolerance: Patient tolerated the procedure well with no immediate complications          1/10/2025    "

## 2025-01-12 LAB
ALBUMIN SERPL-MCNC: 3.6 G/DL (ref 3.4–4.8)
ALBUMIN/GLOB SERPL: 1.6 RATIO (ref 1.1–2)
ALLENS TEST BLOOD GAS (OHS): YES
ALP SERPL-CCNC: 39 UNIT/L (ref 40–150)
ALT SERPL-CCNC: 18 UNIT/L (ref 0–55)
ANION GAP SERPL CALC-SCNC: 11 MEQ/L
AST SERPL-CCNC: 25 UNIT/L (ref 5–34)
B PERT.PT PRMT NPH QL NAA+NON-PROBE: NOT DETECTED
BACTERIA #/AREA URNS AUTO: ABNORMAL /HPF
BASE EXCESS BLD CALC-SCNC: -3.9 MMOL/L (ref -2–2)
BILIRUB SERPL-MCNC: 1.1 MG/DL
BILIRUB UR QL STRIP.AUTO: NEGATIVE
BLOOD GAS SAMPLE TYPE (OHS): ABNORMAL
BNP BLD-MCNC: 265.9 PG/ML
BUN SERPL-MCNC: 16 MG/DL (ref 8.4–25.7)
C PNEUM DNA NPH QL NAA+NON-PROBE: NOT DETECTED
CA-I BLD-SCNC: 1.06 MMOL/L (ref 1.12–1.23)
CALCIUM SERPL-MCNC: 8.2 MG/DL (ref 8.8–10)
CHLORIDE SERPL-SCNC: 103 MMOL/L (ref 98–107)
CLARITY UR: CLEAR
CO2 BLDA-SCNC: 21.8 MMOL/L
CO2 SERPL-SCNC: 18 MMOL/L (ref 23–31)
COHGB MFR BLDA: 2.1 % (ref 0.5–1.5)
COLOR UR AUTO: YELLOW
CREAT SERPL-MCNC: 1.02 MG/DL (ref 0.72–1.25)
CREAT/UREA NIT SERPL: 16
DRAWN BY BLOOD GAS (OHS): ABNORMAL
ERYTHROCYTE [DISTWIDTH] IN BLOOD BY AUTOMATED COUNT: 13.6 % (ref 11.5–17)
FLUAV AG UPPER RESP QL IA.RAPID: NOT DETECTED
FLUBV AG UPPER RESP QL IA.RAPID: NOT DETECTED
GFR SERPLBLD CREATININE-BSD FMLA CKD-EPI: >60 ML/MIN/1.73/M2
GLOBULIN SER-MCNC: 2.3 GM/DL (ref 2.4–3.5)
GLUCOSE SERPL-MCNC: 128 MG/DL (ref 82–115)
GLUCOSE UR QL STRIP: NORMAL
HADV DNA NPH QL NAA+NON-PROBE: NOT DETECTED
HCO3 BLDA-SCNC: 20.7 MMOL/L (ref 22–26)
HCOV 229E RNA NPH QL NAA+NON-PROBE: NOT DETECTED
HCOV HKU1 RNA NPH QL NAA+NON-PROBE: NOT DETECTED
HCOV NL63 RNA NPH QL NAA+NON-PROBE: NOT DETECTED
HCOV OC43 RNA NPH QL NAA+NON-PROBE: NOT DETECTED
HCT VFR BLD AUTO: 32.6 % (ref 42–52)
HGB BLD-MCNC: 10.3 G/DL (ref 14–18)
HGB UR QL STRIP: NEGATIVE
HMPV RNA NPH QL NAA+NON-PROBE: NOT DETECTED
HPIV1 RNA NPH QL NAA+NON-PROBE: NOT DETECTED
HPIV2 RNA NPH QL NAA+NON-PROBE: NOT DETECTED
HPIV3 RNA NPH QL NAA+NON-PROBE: NOT DETECTED
HPIV4 RNA NPH QL NAA+NON-PROBE: NOT DETECTED
HYALINE CASTS #/AREA URNS LPF: ABNORMAL /LPF
INHALED O2 CONCENTRATION: 100 %
KETONES UR QL STRIP: ABNORMAL
LEUKOCYTE ESTERASE UR QL STRIP: NEGATIVE
LPM (OHS): 15
M PNEUMO DNA NPH QL NAA+NON-PROBE: NOT DETECTED
MCH RBC QN AUTO: 30 PG (ref 27–31)
MCHC RBC AUTO-ENTMCNC: 31.6 G/DL (ref 33–36)
MCV RBC AUTO: 95 FL (ref 80–94)
METHGB MFR BLDA: 1.4 % (ref 0.4–1.5)
MRSA PCR SCRN (OHS): NOT DETECTED
MUCOUS THREADS URNS QL MICRO: ABNORMAL /LPF
NITRITE UR QL STRIP: NEGATIVE
NRBC BLD AUTO-RTO: 0 %
O2 HB BLOOD GAS (OHS): 93.7 % (ref 94–97)
OHS QRS DURATION: 84 MS
OHS QTC CALCULATION: 487 MS
OXYGEN DEVICE BLOOD GAS (OHS): ABNORMAL
OXYHGB MFR BLDA: 11 G/DL (ref 12–16)
PCO2 BLDA: 35 MMHG (ref 35–45)
PH BLDA: 7.38 [PH] (ref 7.35–7.45)
PH UR STRIP: 5.5 [PH]
PLATELET # BLD AUTO: 119 X10(3)/MCL (ref 130–400)
PLATELETS.RETICULATED NFR BLD AUTO: 7.2 % (ref 0.9–11.2)
PMV BLD AUTO: 11.6 FL (ref 7.4–10.4)
PO2 BLDA: 71 MMHG (ref 80–100)
POCT GLUCOSE: 138 MG/DL (ref 70–110)
POTASSIUM BLOOD GAS (OHS): 4 MMOL/L (ref 3.5–5)
POTASSIUM SERPL-SCNC: 4 MMOL/L (ref 3.5–5.1)
PROT SERPL-MCNC: 5.9 GM/DL (ref 5.8–7.6)
PROT UR QL STRIP: ABNORMAL
RBC # BLD AUTO: 3.43 X10(6)/MCL (ref 4.7–6.1)
RBC #/AREA URNS AUTO: ABNORMAL /HPF
RSV A 5' UTR RNA NPH QL NAA+PROBE: NOT DETECTED
RSV RNA NPH QL NAA+NON-PROBE: NOT DETECTED
RV+EV RNA NPH QL NAA+NON-PROBE: NOT DETECTED
SAMPLE SITE BLOOD GAS (OHS): ABNORMAL
SAO2 % BLDA: 93.7 %
SARS-COV-2 RNA RESP QL NAA+PROBE: NOT DETECTED
SODIUM BLOOD GAS (OHS): 129 MMOL/L (ref 137–145)
SODIUM SERPL-SCNC: 132 MMOL/L (ref 136–145)
SP GR UR STRIP.AUTO: 1.03 (ref 1–1.03)
SQUAMOUS #/AREA URNS LPF: ABNORMAL /HPF
UROBILINOGEN UR STRIP-ACNC: NORMAL
WBC # BLD AUTO: 10.19 X10(3)/MCL (ref 4.5–11.5)
WBC #/AREA URNS AUTO: ABNORMAL /HPF

## 2025-01-12 PROCEDURE — 93010 ELECTROCARDIOGRAM REPORT: CPT | Mod: ,,, | Performed by: INTERNAL MEDICINE

## 2025-01-12 PROCEDURE — 63600175 PHARM REV CODE 636 W HCPCS: Performed by: PHYSICIAN ASSISTANT

## 2025-01-12 PROCEDURE — 87641 MR-STAPH DNA AMP PROBE: CPT | Performed by: INTERNAL MEDICINE

## 2025-01-12 PROCEDURE — 25500020 PHARM REV CODE 255: Performed by: INTERNAL MEDICINE

## 2025-01-12 PROCEDURE — 93005 ELECTROCARDIOGRAM TRACING: CPT

## 2025-01-12 PROCEDURE — 21400001 HC TELEMETRY ROOM

## 2025-01-12 PROCEDURE — 27000221 HC OXYGEN, UP TO 24 HOURS

## 2025-01-12 PROCEDURE — 63600175 PHARM REV CODE 636 W HCPCS

## 2025-01-12 PROCEDURE — 85027 COMPLETE CBC AUTOMATED: CPT | Performed by: PHYSICIAN ASSISTANT

## 2025-01-12 PROCEDURE — 25000003 PHARM REV CODE 250

## 2025-01-12 PROCEDURE — 87632 RESP VIRUS 6-11 TARGETS: CPT | Performed by: INTERNAL MEDICINE

## 2025-01-12 PROCEDURE — 81001 URINALYSIS AUTO W/SCOPE: CPT | Performed by: INTERNAL MEDICINE

## 2025-01-12 PROCEDURE — 36415 COLL VENOUS BLD VENIPUNCTURE: CPT | Performed by: INTERNAL MEDICINE

## 2025-01-12 PROCEDURE — 87040 BLOOD CULTURE FOR BACTERIA: CPT | Performed by: INTERNAL MEDICINE

## 2025-01-12 PROCEDURE — 99222 1ST HOSP IP/OBS MODERATE 55: CPT | Mod: FS,,, | Performed by: PSYCHIATRY & NEUROLOGY

## 2025-01-12 PROCEDURE — 36415 COLL VENOUS BLD VENIPUNCTURE: CPT | Performed by: PHYSICIAN ASSISTANT

## 2025-01-12 PROCEDURE — 25000003 PHARM REV CODE 250: Performed by: PHYSICIAN ASSISTANT

## 2025-01-12 PROCEDURE — 83880 ASSAY OF NATRIURETIC PEPTIDE: CPT | Performed by: INTERNAL MEDICINE

## 2025-01-12 PROCEDURE — 63600175 PHARM REV CODE 636 W HCPCS: Performed by: INTERNAL MEDICINE

## 2025-01-12 PROCEDURE — 25000242 PHARM REV CODE 250 ALT 637 W/ HCPCS: Performed by: INTERNAL MEDICINE

## 2025-01-12 PROCEDURE — 99900035 HC TECH TIME PER 15 MIN (STAT)

## 2025-01-12 PROCEDURE — 80053 COMPREHEN METABOLIC PANEL: CPT | Performed by: PHYSICIAN ASSISTANT

## 2025-01-12 PROCEDURE — 63600175 PHARM REV CODE 636 W HCPCS: Performed by: THORACIC SURGERY (CARDIOTHORACIC VASCULAR SURGERY)

## 2025-01-12 PROCEDURE — 36600 WITHDRAWAL OF ARTERIAL BLOOD: CPT

## 2025-01-12 PROCEDURE — 99900031 HC PATIENT EDUCATION (STAT)

## 2025-01-12 PROCEDURE — 0241U COVID/RSV/FLU A&B PCR: CPT | Performed by: INTERNAL MEDICINE

## 2025-01-12 PROCEDURE — 82803 BLOOD GASES ANY COMBINATION: CPT

## 2025-01-12 PROCEDURE — 63600175 PHARM REV CODE 636 W HCPCS: Performed by: STUDENT IN AN ORGANIZED HEALTH CARE EDUCATION/TRAINING PROGRAM

## 2025-01-12 RX ORDER — LEVALBUTEROL INHALATION SOLUTION 1.25 MG/3ML
1.25 SOLUTION RESPIRATORY (INHALATION) EVERY 8 HOURS
Status: DISCONTINUED | OUTPATIENT
Start: 2025-01-12 | End: 2025-01-15 | Stop reason: HOSPADM

## 2025-01-12 RX ORDER — FUROSEMIDE 10 MG/ML
40 INJECTION INTRAMUSCULAR; INTRAVENOUS ONCE
Status: COMPLETED | OUTPATIENT
Start: 2025-01-12 | End: 2025-01-12

## 2025-01-12 RX ORDER — LEVALBUTEROL INHALATION SOLUTION 1.25 MG/3ML
1.25 SOLUTION RESPIRATORY (INHALATION) EVERY 8 HOURS
Status: DISCONTINUED | OUTPATIENT
Start: 2025-01-12 | End: 2025-01-12

## 2025-01-12 RX ORDER — LEVOFLOXACIN 5 MG/ML
750 INJECTION, SOLUTION INTRAVENOUS
Status: DISCONTINUED | OUTPATIENT
Start: 2025-01-12 | End: 2025-01-14

## 2025-01-12 RX ORDER — ACETAMINOPHEN 325 MG/1
650 TABLET ORAL EVERY 6 HOURS PRN
Status: DISCONTINUED | OUTPATIENT
Start: 2025-01-12 | End: 2025-01-15 | Stop reason: HOSPADM

## 2025-01-12 RX ADMIN — Medication 81 MG: at 09:01

## 2025-01-12 RX ADMIN — FOLIC ACID 1 MG: 1 TABLET ORAL at 09:01

## 2025-01-12 RX ADMIN — FUROSEMIDE 40 MG: 10 INJECTION, SOLUTION INTRAMUSCULAR; INTRAVENOUS at 09:01

## 2025-01-12 RX ADMIN — DOCUSATE SODIUM 100 MG: 100 CAPSULE, LIQUID FILLED ORAL at 09:01

## 2025-01-12 RX ADMIN — MUPIROCIN: 20 OINTMENT TOPICAL at 10:01

## 2025-01-12 RX ADMIN — AMLODIPINE BESYLATE 5 MG: 5 TABLET ORAL at 09:01

## 2025-01-12 RX ADMIN — ENOXAPARIN SODIUM 40 MG: 40 INJECTION SUBCUTANEOUS at 05:01

## 2025-01-12 RX ADMIN — SUCRALFATE 1 G: 1 TABLET ORAL at 12:01

## 2025-01-12 RX ADMIN — HYDROCODONE BITARTRATE AND ACETAMINOPHEN 1 TABLET: 5; 325 TABLET ORAL at 01:01

## 2025-01-12 RX ADMIN — FAMOTIDINE 20 MG: 10 INJECTION, SOLUTION INTRAVENOUS at 09:01

## 2025-01-12 RX ADMIN — METOPROLOL TARTRATE 12.5 MG: 25 TABLET, FILM COATED ORAL at 09:01

## 2025-01-12 RX ADMIN — KETOROLAC TROMETHAMINE 30 MG: 30 INJECTION, SOLUTION INTRAMUSCULAR; INTRAVENOUS at 06:01

## 2025-01-12 RX ADMIN — HYDROCODONE BITARTRATE AND ACETAMINOPHEN 1 TABLET: 5; 325 TABLET ORAL at 09:01

## 2025-01-12 RX ADMIN — FUROSEMIDE 40 MG: 10 INJECTION, SOLUTION INTRAMUSCULAR; INTRAVENOUS at 02:01

## 2025-01-12 RX ADMIN — OXYCODONE HYDROCHLORIDE 10 MG: 10 TABLET ORAL at 03:01

## 2025-01-12 RX ADMIN — LEVOFLOXACIN 750 MG: 5 INJECTION, SOLUTION INTRAVENOUS at 05:01

## 2025-01-12 RX ADMIN — ATORVASTATIN CALCIUM 40 MG: 40 TABLET, FILM COATED ORAL at 09:01

## 2025-01-12 RX ADMIN — ACETAMINOPHEN 650 MG: 325 TABLET, FILM COATED ORAL at 12:01

## 2025-01-12 RX ADMIN — DOCUSATE SODIUM 100 MG: 100 CAPSULE, LIQUID FILLED ORAL at 08:01

## 2025-01-12 RX ADMIN — LEVALBUTEROL HYDROCHLORIDE 1.25 MG: 1.25 SOLUTION RESPIRATORY (INHALATION) at 07:01

## 2025-01-12 RX ADMIN — LISINOPRIL 40 MG: 20 TABLET ORAL at 09:01

## 2025-01-12 RX ADMIN — IOHEXOL 100 ML: 350 INJECTION, SOLUTION INTRAVENOUS at 01:01

## 2025-01-12 NOTE — PROGRESS NOTES
"[facility] *    Cardiology  Progress Note    Patient Name: Benito Montague III  MRN: [id number]  Admission Date: 1/8/2025  Hospital Length of Stay: 4 days  Code Status: Full Code   Attending Physician: Nirmal Cat MD   Primary Care Physician: Lew Sullivan MD  Expected Discharge Date:   Principal Problem:<principal problem not specified>    Subjective:     Brief HPI/Hospital Course: Mr. Montague is a 65 y/o male with a history of CAD, HTN, DM II, HLD, BRENNAN, who is known to [facility], Dr. Painting. He presented to the [facility] for a scheduled LHC after he was found to have an abnormal EKG and Stress Test. He underwent a LHC on 1.8.24 which revealed Multi-vessel CAD. He was then transferred to [facility] for a CABG Evaluation. No labs available to review. [facility] has been consulted for CAD management.     1.10.25: Intubated. VSS. SB on telemetry.  Plts 66   1.11.25: NAD. VSS. SB on telemetry. No complaints of SOB/Palps. Reports incisional chest pain. "I'm okay"  1.12.25: NAD. VSS. No complaints of Palps. Reports some incisional chest pain . Reports urinary retention. Fevers over night. Reports some shortness of breath early this morning was placed.  "I feel okay today, just some incisional pain" H&H 10.3/32.6,      PMH: CAD, HTN, DM II, HLD, BRENNAN, Essential Tremor, Obesity  PSH: Colonoscopy, ACDF    Family History: Father - HTN, MI, CAD, HLD; Mother - HTN, MI, CAD, HLD, DM II; Sister - Cancer   Social History: Reports alcohol use. Denies illicit drug use and smoking.      Previous Cardiac Diagnostics:   CABG (1.10.25):  Coronary artery bypass grafting X 3 ,   Left internal mammary artery to the LAD   Saphenous venous graft to   OM2  Saphenous venous graft to    PDA  Ligation OF FABY  Endoscopic venous harvesting left greater saphenous vein    ECHO (1.9.25):  Left Ventricle: The left ventricle is normal in size. Increased wall thickness. There is low normal systolic function with a visually estimated " ejection fraction of 50 - 55%. There is normal diastolic function. Right Ventricle: Right ventricle was not well visualized due to poor acoustic window. Aortic Valve: There is no stenosis. There is no significant regurgitation. Mitral Valve: There is no significant regurgitation. Tricuspid Valve: There is trace regurgitation. IVC/SVC: Normal venous pressure at 3 mmHg. Pericardium: There is no pericardial effusion.    Carotid US (1.9.25):  The right internal carotid artery is patent with less than 50% stenosis.  The left internal carotid artery is patent with less than 50% stenosis.  Bilateral vertebral arteries are patent with antegrade flow.    LHC (1.8.25):  RCA:  of the mid RCA. The distal vessel fills via collaterals from the left. Left main: Severe distal LM disease 60-70% eccentric, calcified lesion. LCX: gives rise to 2 small and 2 large OM branches. Of the 2 larger branches the upper has 40-50% stenosis while the lower has a 60-70% stenosis. LAD: mild disease. Gives rise to 2 large diagonal branches.      LVEDP: 15 mmHg  Ao-LV Gradient: 0 mmHg     MPI (1.3.25):  This is an abnormal perfusion study. This scan is suggestive of moderate risk for future cardiovascular events. Medium reversible perfusion abnormality of moderate intensity in the inferior septal region. Medium reversible perfusion abnormality of moderate intensity in the inferior region. Medium reversible perfusion abnormality of moderate intensity in the inferior lateral region. The left ventricular cavity is noted to be normal on the stress study. The left ventricular ejection fraction was calculated to be 51% and left ventricular global function is normal. The study quality is good.     Review of Systems   Constitutional: Positive for fever.   Cardiovascular:  Positive for chest pain (Incisional). Negative for dyspnea on exertion, leg swelling and palpitations.   Respiratory:  Negative for shortness of breath.    Genitourinary:          Urinary retention    All other systems reviewed and are negative.    Objective:     Vital Signs (Most Recent):  Temp: 100.2 °F (37.9 °C) (01/12/25 1241)  Pulse: 80 (01/12/25 1254)  Resp: 18 (01/12/25 1222)  BP: (!) 147/88 (01/12/25 1254)  SpO2: 96 % (01/12/25 1241) Vital Signs (24h Range):  Temp:  [97.9 °F (36.6 °C)-101.5 °F (38.6 °C)] 100.2 °F (37.9 °C)  Pulse:  [60-96] 80  Resp:  [12-33] 18  SpO2:  [90 %-100 %] 96 %  BP: (107-150)/(50-95) 147/88   Weight: 100.8 kg (222 lb 3.6 oz)  Body mass index is 34.81 kg/m².  SpO2: 96 %       Intake/Output Summary (Last 24 hours) at 1/12/2025 1312  Last data filed at 1/12/2025 0537  Gross per 24 hour   Intake 701.59 ml   Output 780 ml   Net -78.41 ml     Lines/Drains/Airways       Drain  Duration                  Chest Tube 01/10/25 1320 Tube - 1 Mediastinal 28 Fr. 1 day         Chest Tube 01/10/25 1321 Left Pericardial 28 Fr. 1 day         Urethral Catheter 01/12/25 1215 16 Fr. <1 day              Line  Duration                  Pacer Wires 01/10/25 1330 1 day              Peripheral Intravenous Line  Duration                  Peripheral IV - Single Lumen 20 G Left Antecubital -- days                    Significant Labs:   Chemistries:   Recent Labs   Lab 01/09/25  0843 01/10/25  0423 01/10/25  1343 01/10/25  1646 01/11/25  0053 01/12/25  0812    139 138 138 139 132*   K 4.9 4.0 4.4 3.6 4.1 4.0    107 110* 109* 110* 103   CO2 24 26 22* 21* 25 18*   BUN 11.6 12.8 9.9 10.1 9.7 16.0   CREATININE 1.14 0.97 0.83 0.90 0.89 1.02   CALCIUM 9.3 8.8 10.1* 8.6* 8.0* 8.2*   BILITOT 0.7 0.6  --   --  1.1 1.1   ALKPHOS 43 41  --   --  35* 39*   ALT 20 18  --   --  17 18   AST 18 12  --   --  24 25   GLUCOSE 173* 97 163* 205* 143* 128*   MG 2.20 2.30  --   --  1.90  --    PHOS  --   --   --   --  1.8*  --         CBC/Anemia Labs: Coags:    Recent Labs   Lab 01/10/25  1646 01/10/25  2351 01/12/25  0812   WBC 11.50 10.94 10.19   HGB 10.1* 11.3* 10.3*   HCT 29.7* 34.2* 32.6*   PLT  155 162 119*   MCV 91.1 91.7 95.0*   RDW 12.8 13.6 13.6    Recent Labs   Lab 01/09/25  1714 01/10/25  1343   INR 1.0 1.5*   APTT 29.2 30.7        Telemetry: SB     Physical Exam  Vitals and nursing note reviewed.   HENT:      Head: Normocephalic.      Mouth/Throat:      Mouth: Mucous membranes are dry.   Eyes:      Extraocular Movements: Extraocular movements intact.   Cardiovascular:      Rate and Rhythm: Normal rate and regular rhythm.      Pulses: Normal pulses.   Pulmonary:      Effort: Pulmonary effort is normal.      Comments:       Abdominal:      Palpations: Abdomen is soft.   Genitourinary:     Comments: + Tony  Musculoskeletal:         General: Normal range of motion.      Cervical back: Normal range of motion.      Comments: Ct x2   Skin:     General: Skin is warm.      Comments: Midline Incision C/D/I   Neurological:      Mental Status: He is alert and oriented to person, place, and time.         Current Schedule Inpatient Medications:   amLODIPine  5 mg Oral Daily    aspirin  81 mg Oral Daily    atorvastatin  40 mg Oral Daily    docusate sodium  100 mg Oral BID    enoxparin  40 mg Subcutaneous Daily    famotidine (PF)  20 mg Intravenous Daily    folic acid  1 mg Oral Daily    levalbuterol  1.25 mg Nebulization Q8H    lisinopriL  40 mg Oral Daily    metoprolol tartrate  12.5 mg Oral BID    mupirocin   Nasal BID    sucralfate  1 g Oral QID (AC & HS)     Continuous Infusions:   D5 and 0.45% NaCl   Intravenous Continuous 100 mL/hr at 01/10/25 1441 New Bag at 01/10/25 1441       Assessment:   CAD    - s/p CABG x3 (1.10.25): LIMA to LAD, SV to OM2, SV to PDA with Ligation of AFBY    - LHC (1.8.25): severe multivessel CAD including distal left main and  of the RCA    - EF 50-55% on ECHO (1.9.25)  Acute Hypoxemic Respiratory Failure requiring Mechanical Ventilation - Currently on Nasal Cannula   Fever   Bradycardia - Resolved   Urinary Retention   Thrombocytopenia - Worsening   Hypotension requiring Epi  -currently stable     - History of HTN  DM II  HLD  BRENNAN  Essential Tremor  Obesity  No known history of GI Bleed    Plan:   Consult to  for fever and urinary retention  Okay to place bustos at this time   Continue ASA and Statin  Resume Lisinopril and Amlodipine for now  Lasix 40 mg IV Today   Chest Tube management per CV surgery Team   Accurate I&O/Daily weight   Keep Mag > 2 and Potassium > 4  Labs: CBC, CMP, and Mag    MARY Prince  Cardiology  [facility]     I agree with the findings of the complexity of problems addressed and take responsibility for the plan's risks and complications. I approved the plan documented by Kayli Gasca NP.

## 2025-01-12 NOTE — PROGRESS NOTES
[facility] - [facility]  Cardiothoracic Surgery  Progress Note  Patient Name: Benito Montague III  MRN: [id number]  Admission Date: 1/8/2025  Hospital Length of Stay: 4 days  Code Status: Full Code   Attending Physician: Nirmal Cat MD   Referring Provider: Chadd Painting MD  Principal Problem:<principal problem not specified>    Subjective:     Post-Op Info:  Procedure(s) (LRB):  CORONARY ARTERY BYPASS GRAFT (CABG) (N/A)  SURGICAL PROCUREMENT, VEIN, ENDOSCOPIC (N/A)  EXCLUSION, LEFT ATRIAL APPENDAGE (N/A)   2 Days Post-Op      Interval History: Transferred to floor yesterday, had episode of respiratory distress overnight, was placed on facemask, SOB mildly improved today, chest tube output of 740ml    Medications:  Continuous Infusions:   D5 and 0.45% NaCl   Intravenous Continuous 100 mL/hr at 01/10/25 1441 New Bag at 01/10/25 1441     Scheduled Meds:   amLODIPine  5 mg Oral Daily    aspirin  81 mg Oral Daily    atorvastatin  40 mg Oral Daily    docusate sodium  100 mg Oral BID    enoxparin  40 mg Subcutaneous Daily    famotidine (PF)  20 mg Intravenous Daily    folic acid  1 mg Oral Daily    levalbuterol  1.25 mg Nebulization Q8H    lisinopriL  40 mg Oral Daily    metoprolol tartrate  12.5 mg Oral BID    mupirocin   Nasal BID    sucralfate  1 g Oral QID (AC & HS)     PRN Meds:  Current Facility-Administered Medications:     0.9%  NaCl infusion (for blood administration), , Intravenous, Q24H PRN    0.9%  NaCl infusion (for blood administration), , Intravenous, Q24H PRN    0.9%  NaCl infusion (for blood administration), , Intravenous, Q24H PRN    albumin human 5%, 12.5 g, Intravenous, PRN    dextrose 50%, 12.5 g, Intravenous, PRN    dextrose 50%, 12.5 g, Intravenous, PRN    dextrose 50%, 25 g, Intravenous, PRN    dextrose 50%, 25 g, Intravenous, PRN    glucagon (human recombinant), 1 mg, Intramuscular, PRN    glucose, 16 g, Oral, PRN    glucose, 24 g, Oral, PRN    HYDROcodone-acetaminophen, 1  tablet, Oral, Q4H PRN    insulin aspart U-100, 0-10 Units, Subcutaneous, QID (AC + HS) PRN    ketorolac, 30 mg, Intravenous, Q6H PRN    lactulose 10 gram/15 ml, 20 g, Oral, Q6H PRN    metoclopramide, 5 mg, Intravenous, Q6H PRN    ondansetron, 4 mg, Intravenous, Q4H PRN    oxyCODONE, 10 mg, Oral, Q4H PRN    vancomycin (VANCOCIN) 1,000 mg in D5W 250 mL IVPB (admixture device), 1,000 mg, Intravenous, On Call Procedure     Objective:     Vital Signs (Most Recent):  Temp: (!) 100.5 °F (38.1 °C) (01/12/25 0738)  Pulse: 78 (01/12/25 0738)  Resp: (!) 23 (01/12/25 0706)  BP: 135/63 (01/12/25 0738)  SpO2: 100 % (01/12/25 0738) Vital Signs (24h Range):  Temp:  [97.9 °F (36.6 °C)-100.5 °F (38.1 °C)] 100.5 °F (38.1 °C)  Pulse:  [60-86] 78  Resp:  [12-33] 23  SpO2:  [90 %-100 %] 100 %  BP: (107-150)/(50-90) 135/63       Intake/Output Summary (Last 24 hours) at 1/12/2025 1002  Last data filed at 1/12/2025 0537  Gross per 24 hour   Intake 1335.23 ml   Output 865 ml   Net 470.23 ml       Physical Exam  Gen: NAD  CVS: Midline dressing c/d/I  Resp: Chest tube in place with serosanguinous drainage  Abd: Soft, NT, ND    Significant Labs:  BMP:   Recent Labs   Lab 01/11/25  0053 01/12/25 0812    132*   K 4.1 4.0   * 103   CO2 25 18*   BUN 9.7 16.0   CREATININE 0.89 1.02   CALCIUM 8.0* 8.2*   MG 1.90  --      CBC:   Recent Labs   Lab 01/12/25 0812   WBC 10.19   RBC 3.43*   HGB 10.3*   HCT 32.6*   *   MCV 95.0*   MCH 30.0   MCHC 31.6*       Significant Diagnostics:  I have reviewed all pertinent imaging results/findings within the past 24 hours.    Assessment/Plan:   POD 2 s/p CABG x 3  - lasix 40mg IV for diuresis  - OOB to chair  - Monitor chest tube output, daily CXR  - chest tubes to water seal  - F/u Pulmonology consult       There are no hospital problems to display for this patient.      Mingo Chavez MD  Cardiothoracic Surgery  [facility] - [facility]

## 2025-01-12 NOTE — H&P
[facility] Medicine History & Physical Examination     Consulted visit       Patient Name: Benito Montague III  MRN: [id number]  Patient Class: IP- Inpatient   Admission Date: 1/8/2025   Admitting Physician: EMIGDIO Service   Length of Stay: 4  Attending Physician: Fermín Canseco MD  Primary Care Provider: Lew uSllivan MD  Face-to-Face encounter date: 01/12/2025  Code Status:full  Chief Complaint: No chief complaint on file.      Screening for Social Drivers for health:  Patient screened for food insecurity, housing instability, transportation needs, utility difficulties, and interpersonal safety (select all that apply as identified as concern)  []Housing or Food  []Transportation Needs  []Utility Difficulties  []Interpersonal safety  [x]None      Patient information was obtained from patient, patient's family, past medical records and ER records.  ED records were reviewed in detail and documented below    HISTORY OF PRESENT ILLNESS:   Benito Montague III is a 64 y.o. male who had CAD and s/p CABG done on 1/10/25. Since 1/12/25 he started to develop a fever. Per nurse, last night he has some facial droop, code fast called and CT brain done. Symptoms resolved, seen by neruo team and advised MRI brain.   Patient also early this morning became SOB and hypoxic. Placed on o2 support and was stable. Per family patients mental status has been sluggish today.    Patent current awake and oriented x 3, following all verbal commands. Denies any fever or chills. He did have urinary retention and now has bustos draining dark colored urine. He denies any abdominal pain or diarrhea.      team consulted for sepsis work up.       PAST MEDICAL HISTORY:     Past Medical History:   Diagnosis Date    Essential (primary) hypertension     Essential tremor     Seasonal allergies     Sleep apnea, unspecified        PAST SURGICAL HISTORY:   No past surgical history on file.    ALLERGIES:    Penicillin and Tuberculin, purified protein derivative    FAMILY HISTORY:   Reviewed and negative    SOCIAL HISTORY:     Social History     Tobacco Use    Smoking status: Never    Smokeless tobacco: Never   Substance Use Topics    Alcohol use: Yes        HOME MEDICATIONS:     Prior to Admission medications    Medication Sig Start Date End Date Taking? Authorizing Provider   amLODIPine (NORVASC) 5 MG tablet Take 5 mg by mouth once daily.    Provider, Historical   cyanocobalamin, vitamin B-12, (VITAMIN B-12 ORAL)   0 Refill(s) 11/1/21   Provider, Historical   glucosamine HCl/chondroitin quinn (GLUCOSAMINE-CHONDROITIN ORAL) Take by mouth. 11/1/21   Provider, Historical   lisinopriL 10 MG tablet Take 40 mg by mouth once daily. 1/25/22   Provider, Historical   montelukast (SINGULAIR) 10 mg tablet Take 10 mg by mouth. 1/25/22   Provider, Historical   pravastatin (PRAVACHOL) 40 MG tablet Take 40 mg by mouth every evening.    Provider, Historical   propranoloL (INDERAL) 10 MG tablet Take 10 mg by mouth. 1/25/22   Provider, Historical   RED YEAST RICE ORAL Take 1,200 mg by mouth. 11/1/21   Provider, Historical       REVIEW OF SYSTEMS:   Except as documented, all other systems reviewed and negative     PHYSICAL EXAM:     VITAL SIGNS: 24 HRS MIN & MAX LAST   Temp  Min: 97.9 °F (36.6 °C)  Max: 101.5 °F (38.6 °C) (!) 101.2 °F (38.4 °C)   BP  Min: 97/55  Max: 150/53 (!) 97/55   Pulse  Min: 60  Max: 96  70   Resp  Min: 12  Max: 33 (!) 22   SpO2  Min: 90 %  Max: 100 % 95 %     General appearance: Well-developed, well-nourished male in no apparent distress. + generalized weakness   HENT: Atraumatic head. Moist mucous membranes of oral cavity.  Eyes: Normal extraocular movements.   Neck: Supple.   Lungs: Clear to auscultation bilaterally. No wheezing present.   Heart: Regular rate and rhythm. S1 and S2 present with no murmurs/gallop/rub. No pedal edema. No JVD present.   Abdomen: Soft, non-distended, non-tender. No rebound  tenderness/guarding. Bowel sounds are normal.   Extremities: No cyanosis, clubbing, or edema.  Skin: No Rash.   Neuro: Motor and sensory exams grossly intact. Good tone. Muscle strength 5/5 in all 4 extremities  Psych/mental status: Appropriate mood and affect. Responds appropriately to questions.     LABS AND IMAGING:     Recent Labs   Lab 01/10/25  1646 01/10/25  2351 01/12/25  0812   WBC 11.50 10.94 10.19   RBC 3.26* 3.73* 3.43*   HGB 10.1* 11.3* 10.3*   HCT 29.7* 34.2* 32.6*   MCV 91.1 91.7 95.0*   MCH 31.0 30.3 30.0   MCHC 34.0 33.0 31.6*   RDW 12.8 13.6 13.6    162 119*   MPV 11.1* 11.4* 11.6*       Recent Labs   Lab 01/09/25  0843 01/10/25  0423 01/10/25  0934 01/10/25  1605 01/10/25  1646 01/11/25  0053 01/11/25  0638 01/12/25  0651 01/12/25  0812    139   < >  --  138 139  --   --  132*   K 4.9 4.0   < >  --  3.6 4.1  --   --  4.0    107   < >  --  109* 110*  --   --  103   CO2 24 26   < >  --  21* 25  --   --  18*   BUN 11.6 12.8   < >  --  10.1 9.7  --   --  16.0   CREATININE 1.14 0.97   < >  --  0.90 0.89  --   --  1.02   CALCIUM 9.3 8.8   < >  --  8.6* 8.0*  --   --  8.2*   PH  --   --    < > 7.260*  --   --  7.460* 7.380  --    MG 2.20 2.30  --   --   --  1.90  --   --   --    ALBUMIN 4.3 4.1  --   --   --  3.4  --   --  3.6   ALKPHOS 43 41  --   --   --  35*  --   --  39*   ALT 20 18  --   --   --  17  --   --  18   AST 18 12  --   --   --  24  --   --  25   BILITOT 0.7 0.6  --   --   --  1.1  --   --  1.1    < > = values in this interval not displayed.       Microbiology Results (last 7 days)       Procedure Component Value Units Date/Time    Blood Culture [[id number]]     Order Status: Sent Specimen: Blood     Blood Culture [[id number]]     Order Status: Sent Specimen: Blood     Respiratory Culture [[id number]]     Order Status: Sent Specimen: Sputum              CTA Head and Neck (xpd)  Narrative: EXAMINATION:  CTA HEAD AND NECK (XPD)    CLINICAL HISTORY:  Neuro deficit, acute,  stroke suspected;    TECHNIQUE:  CT angiogram was performed from the level of the garland to the top of the head following the IV administration of 100mL of Omnipaque 350.   Sagittal and coronal reconstructions and maximum intensity projection reconstructions were performed. Arterial stenosis percentages are based on NASCET measurement criteria.    COMPARISON:  CT 01/12/2025    FINDINGS:  Aorta/heart: Normal 3 vessel aortic arch.  Postsurgical changes of CABG.  Multivessel coronary artery calcifications.  Small pericardial effusion.  Possible trace pneumomediastinum versus air in the left pleural space.    Extracranial carotid circulation: Mild-to-moderate atherosclerotic calcifications of the right carotid bifurcation and right proximal ICA resulting in 20-30% luminal narrowing.  Minimal atherosclerosis of the left proximal ICA without significant luminal narrowing.  No high-grade stenosis, aneurysmal dilatation, or dissection.    Extracranial vertebral circulation: The left vertebral artery is dominant.  No high-grade stenosis, aneurysmal dilatation, or dissection.    Intracranial Arteries: Mild atherosclerotic calcification of the bilateral cavernous ICAs without significant luminal narrowing.  No focal high-grade stenosis, occlusion, or aneurysm.    Venous structures (limited evaluation): Normal.    Skull/Extracranial Contents (limited evaluation): No fracture. Mastoid air cells and paranasal sinuses are essentially clear.    Non-Vascular Structures of the Neck/Thoracic Inlet (limited evaluation): Aerated secretions in the trachea. Left-sided chest tube.  Small left pneumothorax.  Consolidative opacities in the lingula and bilateral lower lobes could reflect atelectasis, aspiration or pneumonia.  Small right pleural effusion. Subcutaneous edema in the left chest wall with small foci of gas dissecting into the anterior neck soft tissues.  Impression: 1. No acute abnormality. No high-grade stenosis or major vessel  occlusion.  2. Mild-to-moderate atherosclerotic calcifications of the right carotid bifurcation and right proximal ICA resulting in 20-30% luminal narrowing. Minimal atherosclerosis of the left proximal ICA without significant luminal narrowing.  3. Mild atherosclerotic calcification of the bilateral cavernous ICAs without significant luminal narrowing.  4. Additional findings detailed above.    Electronically signed by: Gabriel Nova  Date:    01/12/2025  Time:    13:39  CT HEAD FOR STROKE  Narrative: EXAMINATION:  CT HEAD FOR STROKE    CLINICAL HISTORY:  Neuro deficit, acute, stroke suspected;new onset weakness, facial droop;    TECHNIQUE:  Low dose axial CT images obtained throughout the head without intravenous contrast. Sagittal and coronal reconstructions were performed.    COMPARISON:  MRI 08/17/2023    FINDINGS:  Intracranial compartment:    Stable mild prominence of the ventricles and sulci compatible with generalized cerebral volume loss.  No extra-axial blood or fluid collections.    No acute parenchymal hemorrhage identified.  No definite sulcal effacement or loss of gray-white matter differentiation to suggest evolving major vascular distribution infarct.  No intracranial mass effect or midline shift.    Skull/extracranial contents (limited evaluation): No fracture. Mastoid air cells are essentially clear.  Mild patchy mucosal thickening of the maxillary antra and ethmoid air cells.  Impression: No acute intracranial abnormality identified.  Further evaluation may be obtained with MRI as warranted.    Critical results discussed with Lilliana Ann NP at 1320 on 01/12/2025.    Electronically signed by: Gabriel Nova  Date:    01/12/2025  Time:    13:20  X-Ray Chest AP Portable  Narrative: EXAMINATION:  XR CHEST AP PORTABLE    CLINICAL HISTORY:  Post-op;    TECHNIQUE:  Single frontal view of the chest was performed.    COMPARISON:  01/11/2025    FINDINGS:  Left thoracotomy tubes in place.  A pneumothorax  is not seen.  There are atelectatic changes in the lung bases.  Heart size is within normal limits.  Patient has had a previous sternotomy.  Impression: Atelectatic changes in the lung bases.    Electronically signed by: Oracio Escamilla MD  Date:    01/12/2025  Time:    09:35  X-Ray Chest 1 View  EXAMINATION  XR CHEST 1 VIEW    CLINICAL HISTORY  shortness of breath;    TECHNIQUE  A total of 1 frontal image(s) of the chest.    COMPARISON  12 January 2025, 03:24    FINDINGS  Lines/tubes/devices: Grossly unchanged positioning when allowing for differences in technique and patient rotation.    The cardiac silhouette and central vascular structures are unchanged.  The trachea is midline. Left basilar opacity and small pleural effusion are unchanged.  No new or worsening abnormality of the right lung field.    Regional osseous structures and extrathoracic soft tissues are similar.    IMPRESSION  No significant interval change.    Electronically signed by: Mk Delaney  Date:    01/12/2025  Time:    08:05      ASSESSMENT & PLAN:   Post off day 2 fever   CAD s/p CABG  Urinary retention   Generalized weakness   HTN, benign   DM2  BRENNAN   Obesity     Plan:  Patient has been having fever since yesterday   Today had SOB and hypoxia  CXR was unremarkable   Will start sepsis work up with UA, Urine cultures and blood cultures, Recp PCR, MRSA PCR, COVID, Flu, RSV  He is allergic to penicillin. Will add iv levaquin 750 mg daily for now     ABG and todays labs stable     Continue incentive spirometry     Blood sugars stable, continue accu checks    S/B neuro team and MRI brain ordered No neurological deficits seen today     Will continue f/u       VTE Prophylaxis: Lovenox       Patient condition:  Fair     __________________________________________________________________________  INPATIENT LIST OF MEDICATIONS     Scheduled Meds:   amLODIPine  5 mg Oral Daily    aspirin  81 mg Oral Daily    atorvastatin  40 mg Oral Daily    docusate  sodium  100 mg Oral BID    enoxparin  40 mg Subcutaneous Daily    famotidine (PF)  20 mg Intravenous Daily    folic acid  1 mg Oral Daily    levalbuterol  1.25 mg Nebulization Q8H    levoFLOXacin  750 mg Intravenous Q24H    lisinopriL  40 mg Oral Daily    metoprolol tartrate  12.5 mg Oral BID    mupirocin   Nasal BID    sucralfate  1 g Oral QID (AC & HS)     Continuous Infusions:   D5 and 0.45% NaCl   Intravenous Continuous 100 mL/hr at 01/10/25 1441 New Bag at 01/10/25 1441     PRN Meds:.  Current Facility-Administered Medications:     0.9%  NaCl infusion (for blood administration), , Intravenous, Q24H PRN    0.9%  NaCl infusion (for blood administration), , Intravenous, Q24H PRN    0.9%  NaCl infusion (for blood administration), , Intravenous, Q24H PRN    acetaminophen, 650 mg, Oral, Q6H PRN    albumin human 5%, 12.5 g, Intravenous, PRN    dextrose 50%, 12.5 g, Intravenous, PRN    dextrose 50%, 12.5 g, Intravenous, PRN    dextrose 50%, 25 g, Intravenous, PRN    dextrose 50%, 25 g, Intravenous, PRN    glucagon (human recombinant), 1 mg, Intramuscular, PRN    glucose, 16 g, Oral, PRN    glucose, 24 g, Oral, PRN    HYDROcodone-acetaminophen, 1 tablet, Oral, Q4H PRN    insulin aspart U-100, 0-10 Units, Subcutaneous, QID (AC + HS) PRN    ketorolac, 30 mg, Intravenous, Q6H PRN    lactulose 10 gram/15 ml, 20 g, Oral, Q6H PRN    metoclopramide, 5 mg, Intravenous, Q6H PRN    ondansetron, 4 mg, Intravenous, Q4H PRN    oxyCODONE, 10 mg, Oral, Q4H PRN    vancomycin (VANCOCIN) 1,000 mg in D5W 250 mL IVPB (admixture device), 1,000 mg, Intravenous, On Call Procedure      ________________________________________________________________________________    If patient was admitted under observational status it is with my approval/permission.        All diagnosis and differential diagnosis have been reviewed; assessment and plan has been documented; I have personally reviewed the labs and test results that are presently available; I  have reviewed the patients medication list; I have reviewed the consulting providers response and recommendations. I have reviewed or attempted to review medical records based upon their availability.    All of the patient and family questions have been addressed and answered. Patient's is agreeable to the above stated plan. I will continue to monitor closely and make adjustments to medical management as needed.    If patient was admitted under observational status it is with my approval/permission.      Fermín Canseco MD   01/12/2025

## 2025-01-12 NOTE — CONSULTS
[facility] [address]  [facility]urology  Consult Note      Benito Montague III is a 64 y.o. male who presented to [facility] on 1/8/2025 with reports of  hard to arouse, right facial droop, not tracking with eyes, right eye wondering . Onset of symptoms was  at 7am today patient went into respiratory distress and required non-rebreather O2 supplementation. Respiratory status reported as improved with non-rebreather. Then at 12 noon patient was reported by nurse as hard to arouse. At 1250 with continue evaluations by nurse patient noted with slight right facial droop at rest, right eye wondering and generalized weakness. Per patient report he has chest pain and shortness of breath mainly with movement and feels generalized weakness, no unilateral weakness. No slurred speech, remains oriented . At time of stroke team arrival 1:00 pm, NIH 0. Stroke risk factors include  CAD, HTN,DM II, HLD, BRENNAN . Prior stroke history: none.       Past Medical History:   Diagnosis Date    Essential (primary) hypertension     Essential tremor     Seasonal allergies     Sleep apnea, unspecified      No past surgical history on file.  Family History   Problem Relation Name Age of Onset    Diabetes Mother      Heart disease Father       Social History     Tobacco Use    Smoking status: Never    Smokeless tobacco: Never   Substance Use Topics    Alcohol use: Yes    Drug use: Never      Review of patient's allergies indicates:   Allergen Reactions    Penicillin Hives    Tuberculin, purified protein derivative Rash         STROKE DOCUMENTATION   Acute Stroke Times   Last Known Normal Date: 01/12/25  Last Known Normal Time: 0700  Symptom Onset Date: 01/12/25  Symptom Onset Time: 1200  Stroke Team Called Date: 01/12/25  Stroke Team Called Time: 1255  Stroke Team Arrival Date: 01/12/25  Stroke Team Arrival Time: 1300    NIH Scale:  1a. Level of Consciousness: 0-->Alert, keenly responsive  1b. LOC Questions: 0-->Answers both  questions correctly  1c. LOC Commands: 0-->Performs both tasks correctly  2. Best Gaze: 0-->Normal  3. Visual: 0-->No visual loss  4. Facial Palsy: 0-->Normal symmetrical movements  5a. Motor Arm, Left: 0-->No drift, limb holds 90 (or 45) degrees for full 10 secs  5b. Motor Arm, Right: 0-->No drift, limb holds 90 (or 45) degrees for full 10 secs  6a. Motor Leg, Left: 0-->No drift, leg holds 30 degree position for full 5 secs  6b. Motor Leg, Right: 0-->No drift, leg holds 30 degree position for full 5 secs  7. Limb Ataxia: 0-->Absent  8. Sensory: 0-->Normal, no sensory loss  9. Best Language: 0-->No aphasia, normal  10. Dysarthria: 0-->Normal  11. Extinction and Inattention (formerly Neglect): 0-->No abnormality  Total (NIH Stroke Scale): 0     Modified [place] Score: 3  Suzy Coma Scale:15   ABCD2 Score:    [id number] Score:   HAS -BLED Score:   ICH Score:   Hunt [employer] [place] Classification:         Neurological Exam:   LOC: alert and follows requests  Language: No aphasia  Speech: No dysarthria  Orientation: Person, Place, Time  Memory: Recent memory intact, Remote memory intact, Age correct, Month correct  Visual Fields (CN II): Full  EOM (CN III, IV, VI): Full/intact  Pupils (CN III, IV, VI): PERRL  Facial Sensation (CN V): Symmetric  Facial Movement (CN VII): symmetric facial expression  Tongue (CN XII): to midline  Motor*: Arm Left:  Normal (5/5), Leg Left:   Normal (5/5), Arm Right:   Normal (5/5), Leg Right:   Normal (5/5)  Sensation: intact to light touch, temperature and vibration    Laboratory:  BMP:   Lab Results   Component Value Date    GLUCOSE 128 (H) 01/12/2025     (L) 01/12/2025    K 4.0 01/12/2025     01/12/2025    CO2 18 (L) 01/12/2025    BUN 16.0 01/12/2025    CREATININE 1.02 01/12/2025    CALCIUM 8.2 (L) 01/12/2025     CBC:   Lab Results   Component Value Date    WBC 10.19 01/12/2025    RBC 3.43 (L) 01/12/2025    HGB 10.3 (L) 01/12/2025    HCT 32.6 (L) 01/12/2025    HCT 31 (L) 01/10/2025      (L) 01/12/2025    MCV 95.0 (H) 01/12/2025    MCH 30.0 01/12/2025    MCHC 31.6 (L) 01/12/2025     Lipid Panel:   Lab Results   Component Value Date    CHOL 245 (H) 12/02/2021    HDL 54 12/02/2021    TRIG 158 (H) 12/02/2021     Coagulation:   Lab Results   Component Value Date    INR 1.5 (H) 01/10/2025    APTT 30.7 01/10/2025     Hgb A1C:   Lab Results   Component Value Date    HGBA1C 6.0 12/02/2021     TSH:   Lab Results   Component Value Date    TSH 1.4561 12/02/2021       Diagnostic Results  Brain Imaging:   -CTh: Impression: No acute intracranial abnormality identified.   Cerebrovascular Imaging:   -CTA h/n: Impression:  1. No acute abnormality. No high-grade stenosis or major vessel occlusion.  2. Mild-to-moderate atherosclerotic calcifications of the right carotid bifurcation and right proximal ICA resulting in 20-30% luminal narrowing. Minimal atherosclerosis of the left proximal ICA without significant luminal narrowing.  3. Mild atherosclerotic calcification of the bilateral cavernous ICAs without significant luminal narrowing.  Cardiac Evaluation:   -EKG/Telemetry: sinus rhythm with occasional PVC's    Discussed with neurologist on call: 1310, Dr. Gavin present  Thrombolysis Candidate? No, Recent surgery/trauma (< 14 days) , 1/10/25 CABG x 3  -Delays to Thrombolysis?  Not Applicable    Interventional Revascularization Candidate? No; Large core infarct on imaging  and No; no significant neurologic deficit (NIHSS <6)   -Delays to Thrombectomy? Not Applicable  Discussed with Interventional Neurology at 1310, Dr Gavin , recommends no intervention.          Assessment/PLAN:  -low suspicion for stroke. NIH 0.  -not candidate for intervention low NIH, non disabling, recent surgery 1/10 CABG  -No further stroke specific recommendations at this time.  -Signing off. Please let us know if there are any further questions or concerns.    Thank you for your consult.        Lilliana Ann,  NP  Neurology  [facility] - [address] [place] [address]

## 2025-01-13 LAB
ABO + RH BLD: NORMAL
ALBUMIN SERPL-MCNC: 3.3 G/DL (ref 3.4–4.8)
ALBUMIN/GLOB SERPL: 1.7 RATIO (ref 1.1–2)
ALP SERPL-CCNC: 41 UNIT/L (ref 40–150)
ALT SERPL-CCNC: 13 UNIT/L (ref 0–55)
ANION GAP SERPL CALC-SCNC: 10 MEQ/L
AST SERPL-CCNC: 19 UNIT/L (ref 5–34)
BILIRUB SERPL-MCNC: 1.1 MG/DL
BLD PROD TYP BPU: NORMAL
BLOOD UNIT EXPIRATION DATE: NORMAL
BLOOD UNIT TYPE CODE: 600
BUN SERPL-MCNC: 20.2 MG/DL (ref 8.4–25.7)
CALCIUM SERPL-MCNC: 8 MG/DL (ref 8.8–10)
CHLORIDE SERPL-SCNC: 99 MMOL/L (ref 98–107)
CO2 SERPL-SCNC: 23 MMOL/L (ref 23–31)
CREAT SERPL-MCNC: 1.08 MG/DL (ref 0.72–1.25)
CREAT/UREA NIT SERPL: 19
CROSSMATCH INTERPRETATION: NORMAL
DISPENSE STATUS: NORMAL
ERYTHROCYTE [DISTWIDTH] IN BLOOD BY AUTOMATED COUNT: 13.5 % (ref 11.5–17)
FIO2: 70
GFR SERPLBLD CREATININE-BSD FMLA CKD-EPI: >60 ML/MIN/1.73/M2
GLOBULIN SER-MCNC: 2 GM/DL (ref 2.4–3.5)
GLUCOSE SERPL-MCNC: 113 MG/DL (ref 82–115)
GLUCOSE SERPL-MCNC: 117 MG/DL (ref 70–110)
HCO3 UR-SCNC: 21.7 MMOL/L (ref 24–28)
HCT VFR BLD AUTO: 27.7 % (ref 42–52)
HCT VFR BLD CALC: 29 %PCV (ref 36–54)
HGB BLD-MCNC: 10 G/DL
HGB BLD-MCNC: 9.2 G/DL (ref 14–18)
LEFT CCA DIST DIAS: 0 CM/S
LEFT CCA DIST SYS: 130 CM/S
LEFT CCA PROX DIAS: 0 CM/S
LEFT CCA PROX SYS: 102 CM/S
LEFT ECA DIAS: 0 CM/S
LEFT ECA SYS: 136 CM/S
LEFT ICA DIST DIAS: 10 CM/S
LEFT ICA DIST SYS: 100 CM/S
LEFT ICA MID DIAS: 12 CM/S
LEFT ICA MID SYS: 90 CM/S
LEFT ICA PROX DIAS: 7 CM/S
LEFT ICA PROX SYS: 71 CM/S
LEFT VERTEBRAL DIAS: 3 CM/S
LEFT VERTEBRAL SYS: 60 CM/S
MAGNESIUM SERPL-MCNC: 2.1 MG/DL (ref 1.6–2.6)
MCH RBC QN AUTO: 30.1 PG (ref 27–31)
MCHC RBC AUTO-ENTMCNC: 33.2 G/DL (ref 33–36)
MCV RBC AUTO: 90.5 FL (ref 80–94)
NRBC BLD AUTO-RTO: 0 %
OHS CV CAROTID RIGHT ICA EDV HIGHEST: 10
OHS CV CAROTID ULTRASOUND LEFT ICA/CCA RATIO: 0.77
OHS CV CAROTID ULTRASOUND RIGHT ICA/CCA RATIO: 1.54
OHS CV PV CAROTID LEFT HIGHEST CCA: 130
OHS CV PV CAROTID LEFT HIGHEST ICA: 100
OHS CV PV CAROTID RIGHT HIGHEST CCA: 109
OHS CV PV CAROTID RIGHT HIGHEST ICA: 97
OHS CV US CAROTID LEFT HIGHEST EDV: 12
PCO2 BLDA: 34.3 MMHG (ref 35–45)
PH SMN: 7.41 [PH] (ref 7.35–7.45)
PLATELET # BLD AUTO: 119 X10(3)/MCL (ref 130–400)
PLATELETS.RETICULATED NFR BLD AUTO: 7.8 % (ref 0.9–11.2)
PMV BLD AUTO: 12 FL (ref 7.4–10.4)
PO2 BLDA: 44 MMHG (ref 40–60)
POC BE: -3 MMOL/L
POC IONIZED CALCIUM: 0.98 MMOL/L (ref 1.06–1.42)
POC SATURATED O2: 80 % (ref 95–100)
POC TCO2: 23 MMOL/L (ref 24–29)
POCT GLUCOSE: 125 MG/DL (ref 70–110)
POTASSIUM BLD-SCNC: 5.1 MMOL/L (ref 3.5–5.1)
POTASSIUM SERPL-SCNC: 3.9 MMOL/L (ref 3.5–5.1)
PROT SERPL-MCNC: 5.3 GM/DL (ref 5.8–7.6)
RBC # BLD AUTO: 3.06 X10(6)/MCL (ref 4.7–6.1)
RBCS: NORMAL
RBCS: NORMAL
RIGHT CCA DIST DIAS: 2 CM/S
RIGHT CCA DIST SYS: 63 CM/S
RIGHT CCA PROX DIAS: 0 CM/S
RIGHT CCA PROX SYS: 109 CM/S
RIGHT ECA DIAS: 0 CM/S
RIGHT ECA SYS: 103 CM/S
RIGHT ICA DIST DIAS: 10 CM/S
RIGHT ICA DIST SYS: 97 CM/S
RIGHT ICA MID DIAS: 10 CM/S
RIGHT ICA MID SYS: 97 CM/S
RIGHT ICA PROX DIAS: 8 CM/S
RIGHT ICA PROX SYS: 90 CM/S
RIGHT VERTEBRAL DIAS: 0 CM/S
RIGHT VERTEBRAL SYS: 51 CM/S
SAMPLE: ABNORMAL
SODIUM BLD-SCNC: 137 MMOL/L (ref 136–145)
SODIUM SERPL-SCNC: 132 MMOL/L (ref 136–145)
UNIT NUMBER: NORMAL
WBC # BLD AUTO: 13.48 X10(3)/MCL (ref 4.5–11.5)

## 2025-01-13 PROCEDURE — 21400001 HC TELEMETRY ROOM

## 2025-01-13 PROCEDURE — 99900035 HC TECH TIME PER 15 MIN (STAT)

## 2025-01-13 PROCEDURE — 25000003 PHARM REV CODE 250

## 2025-01-13 PROCEDURE — 83735 ASSAY OF MAGNESIUM: CPT

## 2025-01-13 PROCEDURE — 63600175 PHARM REV CODE 636 W HCPCS: Performed by: INTERNAL MEDICINE

## 2025-01-13 PROCEDURE — 80053 COMPREHEN METABOLIC PANEL: CPT | Performed by: PHYSICIAN ASSISTANT

## 2025-01-13 PROCEDURE — 99024 POSTOP FOLLOW-UP VISIT: CPT | Mod: ,,, | Performed by: PHYSICIAN ASSISTANT

## 2025-01-13 PROCEDURE — 63600175 PHARM REV CODE 636 W HCPCS: Performed by: PHYSICIAN ASSISTANT

## 2025-01-13 PROCEDURE — 94760 N-INVAS EAR/PLS OXIMETRY 1: CPT

## 2025-01-13 PROCEDURE — 36415 COLL VENOUS BLD VENIPUNCTURE: CPT | Performed by: PHYSICIAN ASSISTANT

## 2025-01-13 PROCEDURE — 85027 COMPLETE CBC AUTOMATED: CPT | Performed by: PHYSICIAN ASSISTANT

## 2025-01-13 PROCEDURE — 94799 UNLISTED PULMONARY SVC/PX: CPT

## 2025-01-13 PROCEDURE — 99900031 HC PATIENT EDUCATION (STAT)

## 2025-01-13 PROCEDURE — 25000003 PHARM REV CODE 250: Performed by: THORACIC SURGERY (CARDIOTHORACIC VASCULAR SURGERY)

## 2025-01-13 PROCEDURE — 94640 AIRWAY INHALATION TREATMENT: CPT

## 2025-01-13 PROCEDURE — 25000003 PHARM REV CODE 250: Performed by: PHYSICIAN ASSISTANT

## 2025-01-13 PROCEDURE — 25000242 PHARM REV CODE 250 ALT 637 W/ HCPCS: Performed by: INTERNAL MEDICINE

## 2025-01-13 PROCEDURE — 97110 THERAPEUTIC EXERCISES: CPT

## 2025-01-13 RX ORDER — HYDROCODONE BITARTRATE AND ACETAMINOPHEN 5; 325 MG/1; MG/1
1 TABLET ORAL EVERY 6 HOURS PRN
Qty: 28 TABLET | Refills: 0 | Status: SHIPPED | OUTPATIENT
Start: 2025-01-13

## 2025-01-13 RX ORDER — ASPIRIN 81 MG/1
81 TABLET ORAL DAILY
Qty: 30 TABLET | Refills: 0 | Status: SHIPPED | OUTPATIENT
Start: 2025-01-14 | End: 2026-01-14

## 2025-01-13 RX ORDER — PRAVASTATIN SODIUM 40 MG/1
40 TABLET ORAL NIGHTLY
Status: DISCONTINUED | OUTPATIENT
Start: 2025-01-13 | End: 2025-01-15 | Stop reason: HOSPADM

## 2025-01-13 RX ORDER — KETOROLAC TROMETHAMINE 30 MG/ML
30 INJECTION, SOLUTION INTRAMUSCULAR; INTRAVENOUS EVERY 6 HOURS PRN
Status: DISCONTINUED | OUTPATIENT
Start: 2025-01-13 | End: 2025-01-15 | Stop reason: HOSPADM

## 2025-01-13 RX ORDER — TAMSULOSIN HYDROCHLORIDE 0.4 MG/1
0.4 CAPSULE ORAL DAILY
Status: DISCONTINUED | OUTPATIENT
Start: 2025-01-13 | End: 2025-01-15 | Stop reason: HOSPADM

## 2025-01-13 RX ORDER — METOPROLOL TARTRATE 25 MG/1
12.5 TABLET, FILM COATED ORAL 2 TIMES DAILY
Qty: 90 TABLET | Refills: 3 | Status: SHIPPED | OUTPATIENT
Start: 2025-01-13 | End: 2026-01-13

## 2025-01-13 RX ADMIN — LISINOPRIL 40 MG: 20 TABLET ORAL at 08:01

## 2025-01-13 RX ADMIN — FAMOTIDINE 20 MG: 10 INJECTION, SOLUTION INTRAVENOUS at 08:01

## 2025-01-13 RX ADMIN — LEVALBUTEROL HYDROCHLORIDE 1.25 MG: 1.25 SOLUTION RESPIRATORY (INHALATION) at 04:01

## 2025-01-13 RX ADMIN — LEVALBUTEROL HYDROCHLORIDE 1.25 MG: 1.25 SOLUTION RESPIRATORY (INHALATION) at 12:01

## 2025-01-13 RX ADMIN — TAMSULOSIN HYDROCHLORIDE 0.4 MG: 0.4 CAPSULE ORAL at 12:01

## 2025-01-13 RX ADMIN — KETOROLAC TROMETHAMINE 30 MG: 30 INJECTION, SOLUTION INTRAMUSCULAR at 05:01

## 2025-01-13 RX ADMIN — FOLIC ACID 1 MG: 1 TABLET ORAL at 08:01

## 2025-01-13 RX ADMIN — LEVALBUTEROL HYDROCHLORIDE 1.25 MG: 1.25 SOLUTION RESPIRATORY (INHALATION) at 07:01

## 2025-01-13 RX ADMIN — MUPIROCIN: 20 OINTMENT TOPICAL at 09:01

## 2025-01-13 RX ADMIN — LEVOFLOXACIN 750 MG: 5 INJECTION, SOLUTION INTRAVENOUS at 04:01

## 2025-01-13 RX ADMIN — PRAVASTATIN SODIUM 40 MG: 40 TABLET ORAL at 09:01

## 2025-01-13 RX ADMIN — DOCUSATE SODIUM 100 MG: 100 CAPSULE, LIQUID FILLED ORAL at 08:01

## 2025-01-13 RX ADMIN — METOPROLOL TARTRATE 12.5 MG: 25 TABLET, FILM COATED ORAL at 09:01

## 2025-01-13 RX ADMIN — DOCUSATE SODIUM 100 MG: 100 CAPSULE, LIQUID FILLED ORAL at 09:01

## 2025-01-13 RX ADMIN — HYDROCODONE BITARTRATE AND ACETAMINOPHEN 1 TABLET: 5; 325 TABLET ORAL at 04:01

## 2025-01-13 RX ADMIN — HYDROCODONE BITARTRATE AND ACETAMINOPHEN 1 TABLET: 5; 325 TABLET ORAL at 05:01

## 2025-01-13 RX ADMIN — Medication 81 MG: at 08:01

## 2025-01-13 RX ADMIN — ENOXAPARIN SODIUM 40 MG: 40 INJECTION SUBCUTANEOUS at 04:01

## 2025-01-13 RX ADMIN — AMLODIPINE BESYLATE 5 MG: 5 TABLET ORAL at 08:01

## 2025-01-13 RX ADMIN — SUCRALFATE 1 G: 1 TABLET ORAL at 09:01

## 2025-01-13 RX ADMIN — METOPROLOL TARTRATE 12.5 MG: 25 TABLET, FILM COATED ORAL at 08:01

## 2025-01-13 NOTE — ANESTHESIA POSTPROCEDURE EVALUATION
Anesthesia Post Evaluation    Patient: Benito T Dumesnil III    Procedure(s) Performed: Procedure(s) (LRB):  CORONARY ARTERY BYPASS GRAFT (CABG) (N/A)  SURGICAL PROCUREMENT, VEIN, ENDOSCOPIC (N/A)  EXCLUSION, LEFT ATRIAL APPENDAGE (N/A)    Final Anesthesia Type: general      Patient location during evaluation: ICU  Patient participation: No - Unable to Participate, Intubation  Level of consciousness: sedated  Post-procedure vital signs: reviewed and stable  Pain management: adequate  Airway patency: patent  BRENNAN mitigation strategies: Multimodal analgesia  PONV status at discharge: No PONV  Anesthetic complications: no      Cardiovascular status: stable  Respiratory status: ventilator and ETT  Hydration status: euvolemic  Follow-up not needed.              Vitals Value Taken Time   /67 01/13/25 1135   Temp 36.4 °C (97.5 °F) 01/13/25 1135   Pulse 73 01/13/25 1135   Resp 22 01/13/25 0709   SpO2 96 % 01/13/25 1135         No case tracking events are documented in the log.      Pain/Billy Score: Pain Rating Prior to Med Admin: 5 (1/13/2025  5:54 AM)  Pain Rating Post Med Admin: 0 (1/12/2025  2:28 PM)

## 2025-01-13 NOTE — PROGRESS NOTES
CT SURGERY PROGRESS NOTE  Benito Montague III  64 y.o.  1960    Patients Procedure: Procedure(s) (LRB):  CORONARY ARTERY BYPASS GRAFT (CABG) (N/A)  SURGICAL PROCUREMENT, VEIN, ENDOSCOPIC (N/A)  EXCLUSION, LEFT ATRIAL APPENDAGE (N/A)    Subjective  Interval History:  POD 3  Op Note     Signed     Creation Time: 1/10/2025  2:26 PM       Preop diagnosis:  Severe coronary artery disease        Procedure:  Coronary artery bypass grafting X 3 ,   Left internal mammary artery to the LAD   Saphenous venous graft to   OM2  Saphenous venous graft to    PDA  Ligation OF FABY  Endoscopic venous harvesting left greater saphenous vein              Postop diagnosis:  The same as preop     Date: 1/10/2025     Anesthesia:  General     Blood loss: Per perfusion      Surgeon: Al Hallman MD     Assistant: Dana FLOREZ    Medication List  Infusions   D5 and 0.45% NaCl   Intravenous Continuous 100 mL/hr at 01/10/25 1441 New Bag at 01/10/25 1441     Scheduled   amLODIPine  5 mg Oral Daily    aspirin  81 mg Oral Daily    atorvastatin  40 mg Oral Daily    docusate sodium  100 mg Oral BID    enoxparin  40 mg Subcutaneous Daily    famotidine (PF)  20 mg Intravenous Daily    folic acid  1 mg Oral Daily    levalbuterol  1.25 mg Nebulization Q8H    levoFLOXacin  750 mg Intravenous Q24H    lisinopriL  40 mg Oral Daily    metoprolol tartrate  12.5 mg Oral BID    mupirocin   Nasal BID    sucralfate  1 g Oral QID (AC & HS)       Objective:  Recent Vitals:  Temp:  [98.2 °F (36.8 °C)-101.5 °F (38.6 °C)] 98.2 °F (36.8 °C)  Pulse:  [70-96] 79  Resp:  [18-22] 22  SpO2:  [93 %-96 %] 95 %  BP: ()/(55-95) 107/56    Physical Exam     I/O last 24 hrs:  Intake/Output - Last 3 Shifts         01/11 0700  01/12 0659 01/12 0700 01/13 0659 01/13 0700 01/14 0659    P.O. 710 600     I.V. (mL/kg) 33.4 (0.3)      Blood 0      IV Piggyback 591.8      Total Intake(mL/kg) 1335.2 (13.2) 600 (5.9)     Urine (mL/kg/hr) 160 (0.1) 1900  (0.8)     Chest Tube 780 190     Total Output 940 2090     Net +395.2 -1490            Urine Occurrence 2 x 1 x             Labs  BMP:   Recent Labs   Lab 01/13/25  0532   *   K 3.9   CL 99   CO2 23   BUN 20.2   CREATININE 1.08   CALCIUM 8.0*   MG 2.10     CBC:   Recent Labs   Lab 01/13/25  0532   WBC 13.48*   RBC 3.06*   HGB 9.2*   HCT 27.7*   *   MCV 90.5   MCH 30.1   MCHC 33.2     CMP:   Recent Labs   Lab 01/13/25  0532   CALCIUM 8.0*   ALBUMIN 3.3*   *   K 3.9   CO2 23   CL 99   BUN 20.2   CREATININE 1.08   ALKPHOS 41   ALT 13   AST 19   BILITOT 1.1         Imaging:   CXR: X-Ray Chest AP Portable    Result Date: 1/13/2025  No significant change as compared with the previous exam. Support catheters remain in place Electronically signed by: Gwyn Oneal Date:    01/13/2025 Time:    06:19         ASSESSMENT    POD 3  VSS  On lasix   Very sm,all air leak left chest tube  DC ct med CT now - clamp left CT and cxr 24   Mobilize  Add flomax/ dc bustos   DC planning     Case and plan of care discussed with MD Guy Lucas PA-C

## 2025-01-13 NOTE — CONSULTS
[facility] Hospital Medicine Progress Note        Chief Complaint: Inpatient Follow-up for S/P CABG and has fever     HPI:   Benito Montague III is a 64 y.o. male who had CAD and s/p CABG done on 1/10/25. Since 1/12/25 he started to develop a fever. Per nurse, last night he has some facial droop, code fast called and CT brain done. Symptoms resolved, seen by neruo team and advised MRI brain.   Patient also early this morning became SOB and hypoxic. Placed on o2 support and was stable. Per family patients mental status has been sluggish today.     Patent current awake and oriented x 3, following all verbal commands. Denies any fever or chills. He did have urinary retention and now has bustos draining dark colored urine. He denies any abdominal pain or diarrhea.       team consulted for sepsis work up. Blood cultures done, UA normal. He was started on iv levaquin.     Interval Hx:   Patient today awake and comfortable. Sitting up in the chair. No SOB, fever or chills. Has a dry cough.     Case was discussed with patient's nurse and  on the floor.    Objective/physical exam:  General: In no acute distress  Chest: Clear to auscultation bilaterally  Heart: RRR, +S1, S2, no appreciable murmur  Abdomen: Soft, nontender, BS +  Neurologic: Alert and oriented x4, Cranial nerve II-XII intact, Strength 5/5 in all 4 extremities    VITAL SIGNS: 24 HRS MIN & MAX LAST   Temp  Min: 97.5 °F (36.4 °C)  Max: 101.5 °F (38.6 °C) 97.5 °F (36.4 °C)   BP  Min: 97/55  Max: 147/88 123/67   Pulse  Min: 70  Max: 80  73   Resp  Min: 18  Max: 22 (!) 22   SpO2  Min: 93 %  Max: 96 % 96 %     I have reviewed the following labs:  Recent Labs   Lab 01/10/25  2351 01/12/25  0812 01/13/25  0532   WBC 10.94 10.19 13.48*   RBC 3.73* 3.43* 3.06*   HGB 11.3* 10.3* 9.2*   HCT 34.2* 32.6* 27.7*   MCV 91.7 95.0* 90.5   MCH 30.3 30.0 30.1   MCHC 33.0 31.6* 33.2   RDW 13.6 13.6 13.5    119* 119*   MPV 11.4*  11.6* 12.0*     Recent Labs   Lab 01/10/25  0423 01/10/25  0934 01/10/25  1605 01/10/25  1646 01/11/25  0053 01/11/25  0638 01/12/25  0651 01/12/25  0812 01/13/25  0532      < >  --    < > 139  --   --  132* 132*   K 4.0   < >  --    < > 4.1  --   --  4.0 3.9      < >  --    < > 110*  --   --  103 99   CO2 26   < >  --    < > 25  --   --  18* 23   BUN 12.8   < >  --    < > 9.7  --   --  16.0 20.2   CREATININE 0.97   < >  --    < > 0.89  --   --  1.02 1.08   CALCIUM 8.8   < >  --    < > 8.0*  --   --  8.2* 8.0*   PH  --    < > 7.260*  --   --  7.460* 7.380  --   --    MG 2.30  --   --   --  1.90  --   --   --  2.10   ALBUMIN 4.1  --   --   --  3.4  --   --  3.6 3.3*   ALKPHOS 41  --   --   --  35*  --   --  39* 41   ALT 18  --   --   --  17  --   --  18 13   AST 12  --   --   --  24  --   --  25 19   BILITOT 0.6  --   --   --  1.1  --   --  1.1 1.1    < > = values in this interval not displayed.     Microbiology Results (last 7 days)       Procedure Component Value Units Date/Time    Blood Culture [[id number]] Collected: 01/12/25 1654    Order Status: Resulted Specimen: Blood Updated: 01/12/25 1702    Blood Culture [[id number]] Collected: 01/12/25 1654    Order Status: Resulted Specimen: Blood Updated: 01/12/25 1702    Respiratory Culture [[id number]]     Order Status: Sent Specimen: Sputum              See below for Radiology    Assessment/Plan:  Post Op day 2 fever   CAD s/p CABG  Urinary retention   Generalized weakness   HTN, benign   DM2  BRENNAN   Obesity     Plan:  Patient today looks comfortable  Lungs clear   Has been afebrile  Will continue iv levaquin for today 3 days (day2)  Sepsis work up so far negative   Resp PCR and MRSA PCR negative   Blood cultures negative     Continue incentive spirometry, ambulate as tolerated     Has akash for boytentionRene in 24 hrs and continue flomax 0.4 mg po daily     VTE prophylaxis: Lovenox    Patient condition:  Fair        All diagnosis and differential  diagnosis have been reviewed; assessment and plan has been documented; I have personally reviewed the labs and test results that are presently available; I have reviewed the patients medication list; I have reviewed the consulting providers response and recommendations. I have reviewed or attempted to review medical records based upon their availability    All of the patient's questions have been  addressed and answered. Patient's is agreeable to the above stated plan. I will continue to monitor closely and make adjustments to medical management as needed.    Portions of this note dictated using EMR integrated voice recognition software, and may be subject to voice recognition errors not corrected at proofreading. Please contact writer for clarification if needed.   _____________________________________________________________________    Malnutrition Status:  Nutrition consulted. Most recent weight and BMI monitored-     Measurements:  Wt Readings from Last 1 Encounters:   01/13/25 101.7 kg (224 lb 3.3 oz)   Body mass index is 35.12 kg/m².    Patient has been screened and assessed by RD.    Malnutrition Type:  Context:    Level:      Malnutrition Characteristic Summary:       Interventions/Recommendations (treatment strategy):        Scheduled Med:   amLODIPine  5 mg Oral Daily    aspirin  81 mg Oral Daily    docusate sodium  100 mg Oral BID    enoxparin  40 mg Subcutaneous Daily    famotidine (PF)  20 mg Intravenous Daily    folic acid  1 mg Oral Daily    levalbuterol  1.25 mg Nebulization Q8H    levoFLOXacin  750 mg Intravenous Q24H    lisinopriL  40 mg Oral Daily    metoprolol tartrate  12.5 mg Oral BID    mupirocin   Nasal BID    pravastatin  40 mg Oral QHS    sucralfate  1 g Oral QID (AC & HS)    tamsulosin  0.4 mg Oral Daily      Continuous Infusions:     PRN Meds:    Current Facility-Administered Medications:     0.9%  NaCl infusion (for blood administration), , Intravenous, Q24H PRN    0.9%  NaCl infusion  (for blood administration), , Intravenous, Q24H PRN    0.9%  NaCl infusion (for blood administration), , Intravenous, Q24H PRN    acetaminophen, 650 mg, Oral, Q6H PRN    albumin human 5%, 12.5 g, Intravenous, PRN    dextrose 50%, 12.5 g, Intravenous, PRN    dextrose 50%, 12.5 g, Intravenous, PRN    dextrose 50%, 25 g, Intravenous, PRN    dextrose 50%, 25 g, Intravenous, PRN    glucagon (human recombinant), 1 mg, Intramuscular, PRN    glucose, 16 g, Oral, PRN    glucose, 24 g, Oral, PRN    HYDROcodone-acetaminophen, 1 tablet, Oral, Q4H PRN    insulin aspart U-100, 0-10 Units, Subcutaneous, QID (AC + HS) PRN    ketorolac, 30 mg, Intravenous, Q6H PRN    lactulose 10 gram/15 ml, 20 g, Oral, Q6H PRN    metoclopramide, 5 mg, Intravenous, Q6H PRN    ondansetron, 4 mg, Intravenous, Q4H PRN    vancomycin (VANCOCIN) 1,000 mg in D5W 250 mL IVPB (admixture device), 1,000 mg, Intravenous, On Call Procedure     Radiology:  I have personally reviewed the following imaging and agree with the radiologist.     Electrophysiology Procedure  Procedure performed in the Invasive Lab    - See Procedure Log link below for nursing documentation    - See OpNote on Surgeries Tab for physician findings    - See Imaging Tab for radiologist dictation  Cardiac catheterization  Procedure performed in the Invasive Lab    - See Procedure Log link below for nursing documentation    - See OpNote on Surgeries Tab for physician findings    - See Imaging Tab for radiologist dictation  X-Ray Chest AP Portable  Narrative: EXAMINATION:  XR CHEST AP PORTABLE    CLINICAL HISTORY:  Post-op;    TECHNIQUE:  Single frontal view of the chest was performed.    COMPARISON:  January 12, 2025    FINDINGS:  Examination reveals cardiomediastinal silhouette and pleuroparenchymal changes to be essentially unchanged as compared with the previous exam.    Support catheters remain in place  Impression: No significant change as compared with the previous exam.    Support  catheters remain in place    Electronically signed by: Gwyn Oneal  Date:    01/13/2025  Time:    06:19  CV Ultrasound Bilateral Doppler Carotid  The right internal carotid artery is patent with less than 50% stenosis.  The left internal carotid artery is patent with less than 50% stenosis.  Bilateral vertebral arteries are patent with antegrade flow.      Fermín Canseco MD  Department of Hospital Medicine   [facility]   01/13/2025

## 2025-01-13 NOTE — PROGRESS NOTES
"[facility] *    Cardiology  Progress Note    Patient Name: Benito Montague III  MRN: [id number]  Admission Date: 1/8/2025  Hospital Length of Stay: 5 days  Code Status: Full Code   Attending Physician: Nirmal Cat MD   Primary Care Physician: Lew Sullivan MD  Expected Discharge Date:   Principal Problem:<principal problem not specified>    Subjective:     Brief HPI/Hospital Course: Mr. Montague is a 65 y/o male with a history of CAD, HTN, DM II, HLD, BRENNAN, who is known to [facility], Dr. Painting. He presented to the [facility] for a scheduled LHC after he was found to have an abnormal EKG and Stress Test. He underwent a LHC on 1.8.24 which revealed Multi-vessel CAD. He was then transferred to [facility] for a CABG Evaluation. No labs available to review. [facility] has been consulted for CAD management.     1.10.25: Intubated. VSS. SB on telemetry.  Plts 66   1.11.25: NAD. VSS. SB on telemetry. No complaints of SOB/Palps. Reports incisional chest pain. "I'm okay"  1.12.25: NAD. VSS. No complaints of Palps. Reports some incisional chest pain . Reports urinary retention. Fevers over night. Reports some shortness of breath early this morning was placed. "I feel okay today, just some incisional pain" H&H 10.3/32.6,   1.13.25: NAD. VSS. Sitting in chair. "I feel better today." Reports improvement in his SOB. Reports incisional chest pain and cough. He had a tasia fast ran yesterday due to stroke like symptoms.       PMH: CAD, HTN, DM II, HLD, BRENNAN, Essential Tremor, Obesity  PSH: Colonoscopy, ACDF    Family History: Father - HTN, MI, CAD, HLD; Mother - HTN, MI, CAD, HLD, DM II; Sister - Cancer   Social History: Reports alcohol use. Denies illicit drug use and smoking.      Previous Cardiac Diagnostics:   CABG (1.10.25):  Coronary artery bypass grafting X 3 ,   Left internal mammary artery to the LAD   Saphenous venous graft to   OM2  Saphenous venous graft to    PDA  Ligation OF FABY  Endoscopic venous harvesting " left greater saphenous vein    ECHO (1.9.25):  Left Ventricle: The left ventricle is normal in size. Increased wall thickness. There is low normal systolic function with a visually estimated ejection fraction of 50 - 55%. There is normal diastolic function. Right Ventricle: Right ventricle was not well visualized due to poor acoustic window. Aortic Valve: There is no stenosis. There is no significant regurgitation. Mitral Valve: There is no significant regurgitation. Tricuspid Valve: There is trace regurgitation. IVC/SVC: Normal venous pressure at 3 mmHg. Pericardium: There is no pericardial effusion.    Carotid US (1.9.25):  The right internal carotid artery is patent with less than 50% stenosis.  The left internal carotid artery is patent with less than 50% stenosis.  Bilateral vertebral arteries are patent with antegrade flow.    LHC (1.8.25):  RCA:  of the mid RCA. The distal vessel fills via collaterals from the left. Left main: Severe distal LM disease 60-70% eccentric, calcified lesion. LCX: gives rise to 2 small and 2 large OM branches. Of the 2 larger branches the upper has 40-50% stenosis while the lower has a 60-70% stenosis. LAD: mild disease. Gives rise to 2 large diagonal branches.      LVEDP: 15 mmHg  Ao-LV Gradient: 0 mmHg     MPI (1.3.25):  This is an abnormal perfusion study. This scan is suggestive of moderate risk for future cardiovascular events. Medium reversible perfusion abnormality of moderate intensity in the inferior septal region. Medium reversible perfusion abnormality of moderate intensity in the inferior region. Medium reversible perfusion abnormality of moderate intensity in the inferior lateral region. The left ventricular cavity is noted to be normal on the stress study. The left ventricular ejection fraction was calculated to be 51% and left ventricular global function is normal. The study quality is good.     Review of Systems   Constitutional: Positive for fever.    Cardiovascular:  Positive for chest pain (Incisional). Negative for dyspnea on exertion, leg swelling and palpitations.   Respiratory:  Positive for cough. Negative for shortness of breath.    Genitourinary:         Urinary retention    All other systems reviewed and are negative.    Objective:     Vital Signs (Most Recent):  Temp: 98.2 °F (36.8 °C) (01/13/25 0757)  Pulse: 79 (01/13/25 0757)  Resp: (!) 22 (01/13/25 0709)  BP: (!) 107/56 (01/13/25 0757)  SpO2: 95 % (01/13/25 0757) Vital Signs (24h Range):  Temp:  [98.2 °F (36.8 °C)-101.5 °F (38.6 °C)] 98.2 °F (36.8 °C)  Pulse:  [70-96] 79  Resp:  [18-22] 22  SpO2:  [93 %-96 %] 95 %  BP: ()/(55-95) 107/56   Weight: 101.7 kg (224 lb 3.3 oz)  Body mass index is 35.12 kg/m².  SpO2: 95 %       Intake/Output Summary (Last 24 hours) at 1/13/2025 0900  Last data filed at 1/13/2025 0426  Gross per 24 hour   Intake 600 ml   Output 2090 ml   Net -1490 ml     Lines/Drains/Airways       Drain  Duration                  Chest Tube 01/10/25 1320 Tube - 1 Mediastinal 28 Fr. 2 days         Chest Tube 01/10/25 1321 Left Pericardial 28 Fr. 2 days         Urethral Catheter 01/12/25 1215 16 Fr. <1 day              Line  Duration                  Pacer Wires 01/10/25 1330 2 days              Peripheral Intravenous Line  Duration                  Peripheral IV - Single Lumen 20 G Left Antecubital -- days                    Significant Labs:   Chemistries:   Recent Labs   Lab 01/10/25  0423 01/10/25  1343 01/10/25  1646 01/11/25  0053 01/12/25  0812 01/13/25  0532    138 138 139 132* 132*   K 4.0 4.4 3.6 4.1 4.0 3.9    110* 109* 110* 103 99   CO2 26 22* 21* 25 18* 23   BUN 12.8 9.9 10.1 9.7 16.0 20.2   CREATININE 0.97 0.83 0.90 0.89 1.02 1.08   CALCIUM 8.8 10.1* 8.6* 8.0* 8.2* 8.0*   BILITOT 0.6  --   --  1.1 1.1 1.1   ALKPHOS 41  --   --  35* 39* 41   ALT 18  --   --  17 18 13   AST 12  --   --  24 25 19   GLUCOSE 97 163* 205* 143* 128* 113   MG 2.30  --   --  1.90  --   2.10   PHOS  --   --   --  1.8*  --   --         CBC/Anemia Labs: Coags:    Recent Labs   Lab 01/10/25  2351 01/12/25  0812 01/13/25  0532   WBC 10.94 10.19 13.48*   HGB 11.3* 10.3* 9.2*   HCT 34.2* 32.6* 27.7*    119* 119*   MCV 91.7 95.0* 90.5   RDW 13.6 13.6 13.5    Recent Labs   Lab 01/09/25  1714 01/10/25  1343   INR 1.0 1.5*   APTT 29.2 30.7        Telemetry: SB     Physical Exam  Vitals and nursing note reviewed.   HENT:      Head: Normocephalic.      Mouth/Throat:      Mouth: Mucous membranes are dry.   Eyes:      Extraocular Movements: Extraocular movements intact.   Cardiovascular:      Rate and Rhythm: Normal rate and regular rhythm.      Pulses: Normal pulses.   Pulmonary:      Effort: Pulmonary effort is normal.      Comments:       Abdominal:      Palpations: Abdomen is soft.   Genitourinary:     Comments: + Tony  Musculoskeletal:         General: Normal range of motion.      Cervical back: Normal range of motion.      Comments: Ct x2   Skin:     General: Skin is warm.      Comments: Midline Incision C/D/I   Neurological:      Mental Status: He is alert and oriented to person, place, and time.         Current Schedule Inpatient Medications:   amLODIPine  5 mg Oral Daily    aspirin  81 mg Oral Daily    atorvastatin  40 mg Oral Daily    docusate sodium  100 mg Oral BID    enoxparin  40 mg Subcutaneous Daily    famotidine (PF)  20 mg Intravenous Daily    folic acid  1 mg Oral Daily    levalbuterol  1.25 mg Nebulization Q8H    levoFLOXacin  750 mg Intravenous Q24H    lisinopriL  40 mg Oral Daily    metoprolol tartrate  12.5 mg Oral BID    mupirocin   Nasal BID    sucralfate  1 g Oral QID (AC & HS)     Continuous Infusions:   D5 and 0.45% NaCl   Intravenous Continuous 100 mL/hr at 01/10/25 1441 New Bag at 01/10/25 1441       Assessment:   CAD    - s/p CABG x3 (1.10.25): LIMA to LAD, SV to OM2, SV to PDA with Ligation of FABY    - LHC (1.8.25): severe multivessel CAD including distal left main and   of the RCA    - EF 50-55% on ECHO (1.9.25)  Acute Hypoxemic Respiratory Failure requiring Mechanical Ventilation - Currently on Room Air   Fever   Leukocytosis   AMS resolved   Hyponatremia   Bradycardia - Resolved   Urinary Retention   Thrombocytopenia - Worsening   Hypotension requiring Epi - Resolved     - History of HTN  DM II  HLD  BRENNAN  Essential Tremor  Obesity  No known history of GI Bleed    Plan:   Continue ASA and Statin  Continue Lisinopril and Amlodipine   IV Antibiotics per HM Team   Chest Tube management per CV surgery Team   Stroke ruled out my Neurology Team   Fever work-up in progress   Accurate I&O/Daily weight   Keep Mag > 2 and Potassium > 4  Labs: CBC, CMP, and Mag    MARY Prince  Cardiology  [facility]    I agree with the findings of the complexity of problems addressed and take responsibility for the plan's risks and complications. I approved the plan documented by Kayli Gasca NP.

## 2025-01-13 NOTE — PROGRESS NOTES
01/13/25 0930   Pre Exercise Vitals   /68   Pulse 78  (NSR)   Supplemental O2? No   SpO2 96 %   During Exercise Vitals   Pulse 95  (NSR)   Supplemental O2? No   SpO2 93 %   Distance Walked 100 ft   Post Exercise Vitals   /67   Pulse 74  (NSR)   Supplemental O2? No   SpO2 96 %   Modality   Modality   (rollator)     Communicated with nurse prior and post activity. Standby assist sit to stand, maintains sternal precautions. Gait slow. Encouraged increased activity and use of Incentive Spirometry - performs to 750 ml. Goal set for 1000 ml by tomorrow. Post op heart instructions reviewed with patient and wife - questions answered.   no

## 2025-01-14 LAB
ANION GAP SERPL CALC-SCNC: 8 MEQ/L
BASOPHILS # BLD AUTO: 0.06 X10(3)/MCL
BASOPHILS NFR BLD AUTO: 0.5 %
BUN SERPL-MCNC: 19 MG/DL (ref 8.4–25.7)
CALCIUM SERPL-MCNC: 8 MG/DL (ref 8.8–10)
CHLORIDE SERPL-SCNC: 101 MMOL/L (ref 98–107)
CO2 SERPL-SCNC: 26 MMOL/L (ref 23–31)
CREAT SERPL-MCNC: 1.02 MG/DL (ref 0.72–1.25)
CREAT/UREA NIT SERPL: 19
EOSINOPHIL # BLD AUTO: 0.5 X10(3)/MCL (ref 0–0.9)
EOSINOPHIL NFR BLD AUTO: 4.3 %
ERYTHROCYTE [DISTWIDTH] IN BLOOD BY AUTOMATED COUNT: 13.2 % (ref 11.5–17)
GFR SERPLBLD CREATININE-BSD FMLA CKD-EPI: >60 ML/MIN/1.73/M2
GLUCOSE SERPL-MCNC: 120 MG/DL (ref 82–115)
HCT VFR BLD AUTO: 27 % (ref 42–52)
HGB BLD-MCNC: 9.1 G/DL (ref 14–18)
IMM GRANULOCYTES # BLD AUTO: 0.05 X10(3)/MCL (ref 0–0.04)
IMM GRANULOCYTES NFR BLD AUTO: 0.4 %
LYMPHOCYTES # BLD AUTO: 1.42 X10(3)/MCL (ref 0.6–4.6)
LYMPHOCYTES NFR BLD AUTO: 12.3 %
MAGNESIUM SERPL-MCNC: 2.2 MG/DL (ref 1.6–2.6)
MCH RBC QN AUTO: 30.6 PG (ref 27–31)
MCHC RBC AUTO-ENTMCNC: 33.7 G/DL (ref 33–36)
MCV RBC AUTO: 90.9 FL (ref 80–94)
MONOCYTES # BLD AUTO: 1.2 X10(3)/MCL (ref 0.1–1.3)
MONOCYTES NFR BLD AUTO: 10.4 %
NEUTROPHILS # BLD AUTO: 8.35 X10(3)/MCL (ref 2.1–9.2)
NEUTROPHILS NFR BLD AUTO: 72.1 %
NRBC BLD AUTO-RTO: 0 %
PLATELET # BLD AUTO: 141 X10(3)/MCL (ref 130–400)
PLATELETS.RETICULATED NFR BLD AUTO: 8.2 % (ref 0.9–11.2)
PMV BLD AUTO: 11.4 FL (ref 7.4–10.4)
POCT GLUCOSE: 124 MG/DL (ref 70–110)
POCT GLUCOSE: 160 MG/DL (ref 70–110)
POTASSIUM SERPL-SCNC: 3.8 MMOL/L (ref 3.5–5.1)
RBC # BLD AUTO: 2.97 X10(6)/MCL (ref 4.7–6.1)
SODIUM SERPL-SCNC: 135 MMOL/L (ref 136–145)
WBC # BLD AUTO: 11.58 X10(3)/MCL (ref 4.5–11.5)

## 2025-01-14 PROCEDURE — 25000003 PHARM REV CODE 250: Performed by: THORACIC SURGERY (CARDIOTHORACIC VASCULAR SURGERY)

## 2025-01-14 PROCEDURE — 93005 ELECTROCARDIOGRAM TRACING: CPT

## 2025-01-14 PROCEDURE — 85025 COMPLETE CBC W/AUTO DIFF WBC: CPT

## 2025-01-14 PROCEDURE — 25000242 PHARM REV CODE 250 ALT 637 W/ HCPCS: Performed by: INTERNAL MEDICINE

## 2025-01-14 PROCEDURE — 93010 ELECTROCARDIOGRAM REPORT: CPT | Mod: ,,, | Performed by: INTERNAL MEDICINE

## 2025-01-14 PROCEDURE — 94760 N-INVAS EAR/PLS OXIMETRY 1: CPT

## 2025-01-14 PROCEDURE — 25000003 PHARM REV CODE 250: Performed by: PHYSICIAN ASSISTANT

## 2025-01-14 PROCEDURE — 83735 ASSAY OF MAGNESIUM: CPT

## 2025-01-14 PROCEDURE — 36415 COLL VENOUS BLD VENIPUNCTURE: CPT

## 2025-01-14 PROCEDURE — 63600175 PHARM REV CODE 636 W HCPCS: Performed by: PHYSICIAN ASSISTANT

## 2025-01-14 PROCEDURE — 25000003 PHARM REV CODE 250

## 2025-01-14 PROCEDURE — 94761 N-INVAS EAR/PLS OXIMETRY MLT: CPT

## 2025-01-14 PROCEDURE — 99900035 HC TECH TIME PER 15 MIN (STAT)

## 2025-01-14 PROCEDURE — 80048 BASIC METABOLIC PNL TOTAL CA: CPT

## 2025-01-14 PROCEDURE — 21400001 HC TELEMETRY ROOM

## 2025-01-14 PROCEDURE — 25000003 PHARM REV CODE 250: Performed by: NURSE PRACTITIONER

## 2025-01-14 PROCEDURE — 94799 UNLISTED PULMONARY SVC/PX: CPT

## 2025-01-14 PROCEDURE — 97110 THERAPEUTIC EXERCISES: CPT

## 2025-01-14 PROCEDURE — 94640 AIRWAY INHALATION TREATMENT: CPT

## 2025-01-14 PROCEDURE — 63600175 PHARM REV CODE 636 W HCPCS

## 2025-01-14 PROCEDURE — 63600175 PHARM REV CODE 636 W HCPCS: Performed by: INTERNAL MEDICINE

## 2025-01-14 PROCEDURE — 99900031 HC PATIENT EDUCATION (STAT)

## 2025-01-14 PROCEDURE — 63600175 PHARM REV CODE 636 W HCPCS: Performed by: THORACIC SURGERY (CARDIOTHORACIC VASCULAR SURGERY)

## 2025-01-14 RX ORDER — FUROSEMIDE 10 MG/ML
40 INJECTION INTRAMUSCULAR; INTRAVENOUS ONCE
Status: COMPLETED | OUTPATIENT
Start: 2025-01-14 | End: 2025-01-14

## 2025-01-14 RX ORDER — LEVOFLOXACIN 5 MG/ML
750 INJECTION, SOLUTION INTRAVENOUS ONCE
Status: COMPLETED | OUTPATIENT
Start: 2025-01-14 | End: 2025-01-14

## 2025-01-14 RX ORDER — METOPROLOL TARTRATE 1 MG/ML
5 INJECTION, SOLUTION INTRAVENOUS EVERY 5 MIN PRN
Status: DISPENSED | OUTPATIENT
Start: 2025-01-14 | End: 2025-01-14

## 2025-01-14 RX ORDER — METOPROLOL TARTRATE 1 MG/ML
5 INJECTION, SOLUTION INTRAVENOUS
Status: DISCONTINUED | OUTPATIENT
Start: 2025-01-14 | End: 2025-01-15 | Stop reason: HOSPADM

## 2025-01-14 RX ADMIN — LEVALBUTEROL HYDROCHLORIDE 1.25 MG: 1.25 SOLUTION RESPIRATORY (INHALATION) at 11:01

## 2025-01-14 RX ADMIN — HYDROCODONE BITARTRATE AND ACETAMINOPHEN 1 TABLET: 5; 325 TABLET ORAL at 09:01

## 2025-01-14 RX ADMIN — DOCUSATE SODIUM 100 MG: 100 CAPSULE, LIQUID FILLED ORAL at 08:01

## 2025-01-14 RX ADMIN — PRAVASTATIN SODIUM 40 MG: 40 TABLET ORAL at 08:01

## 2025-01-14 RX ADMIN — LEVALBUTEROL HYDROCHLORIDE 1.25 MG: 1.25 SOLUTION RESPIRATORY (INHALATION) at 12:01

## 2025-01-14 RX ADMIN — METOPROLOL TARTRATE 12.5 MG: 25 TABLET, FILM COATED ORAL at 09:01

## 2025-01-14 RX ADMIN — Medication 81 MG: at 09:01

## 2025-01-14 RX ADMIN — MUPIROCIN: 20 OINTMENT TOPICAL at 08:01

## 2025-01-14 RX ADMIN — SUCRALFATE 1 G: 1 TABLET ORAL at 03:01

## 2025-01-14 RX ADMIN — FAMOTIDINE 20 MG: 10 INJECTION, SOLUTION INTRAVENOUS at 09:01

## 2025-01-14 RX ADMIN — SUCRALFATE 1 G: 1 TABLET ORAL at 10:01

## 2025-01-14 RX ADMIN — DOCUSATE SODIUM 100 MG: 100 CAPSULE, LIQUID FILLED ORAL at 09:01

## 2025-01-14 RX ADMIN — AMLODIPINE BESYLATE 5 MG: 5 TABLET ORAL at 09:01

## 2025-01-14 RX ADMIN — LACTULOSE 20 G: 10 SOLUTION ORAL at 10:01

## 2025-01-14 RX ADMIN — FUROSEMIDE 40 MG: 10 INJECTION, SOLUTION INTRAMUSCULAR; INTRAVENOUS at 10:01

## 2025-01-14 RX ADMIN — FOLIC ACID 1 MG: 1 TABLET ORAL at 09:01

## 2025-01-14 RX ADMIN — SUCRALFATE 1 G: 1 TABLET ORAL at 08:01

## 2025-01-14 RX ADMIN — SUCRALFATE 1 G: 1 TABLET ORAL at 05:01

## 2025-01-14 RX ADMIN — METOPROLOL TARTRATE 5 MG: 1 INJECTION, SOLUTION INTRAVENOUS at 07:01

## 2025-01-14 RX ADMIN — ENOXAPARIN SODIUM 40 MG: 40 INJECTION SUBCUTANEOUS at 03:01

## 2025-01-14 RX ADMIN — INSULIN ASPART 2 UNITS: 100 INJECTION, SOLUTION INTRAVENOUS; SUBCUTANEOUS at 11:01

## 2025-01-14 RX ADMIN — LEVALBUTEROL HYDROCHLORIDE 1.25 MG: 1.25 SOLUTION RESPIRATORY (INHALATION) at 07:01

## 2025-01-14 RX ADMIN — LEVALBUTEROL HYDROCHLORIDE 1.25 MG: 1.25 SOLUTION RESPIRATORY (INHALATION) at 04:01

## 2025-01-14 RX ADMIN — METOPROLOL TARTRATE 12.5 MG: 25 TABLET, FILM COATED ORAL at 08:01

## 2025-01-14 RX ADMIN — TAMSULOSIN HYDROCHLORIDE 0.4 MG: 0.4 CAPSULE ORAL at 09:01

## 2025-01-14 RX ADMIN — LEVOFLOXACIN 750 MG: 5 INJECTION, SOLUTION INTRAVENOUS at 11:01

## 2025-01-14 RX ADMIN — LISINOPRIL 40 MG: 20 TABLET ORAL at 09:01

## 2025-01-14 NOTE — PLAN OF CARE
I spoke to pt and wife regarding [facility] choice . [facility] selected, referral sent

## 2025-01-14 NOTE — PROGRESS NOTES
01/14/25 0930   Pre Exercise Vitals   /70   Pulse 69  (NSR)   Supplemental O2? No   SpO2 99 %   During Exercise Vitals   Pulse 86  (NSR)   Supplemental O2? No   SpO2 96 %   Distance Walked 150 ft   Post Exercise Vitals   /70   Pulse 74  (NSR)   Supplemental O2? No   SpO2 98 %   Modality   Modality   (rollator)     Communicated with nurse prior and post activity. Standby assist with 2 attempts sit to stand, maintains sternal precautions.Encouraged increased activity and use of Incentive Spirometry - performs to 1000 ml.

## 2025-01-14 NOTE — PROGRESS NOTES
Benito Montague III is a 64 y.o. male patient.   1. Atherosclerosis of native coronary artery of native heart without angina pectoris    2. CAD (coronary artery disease)    3. Bradycardia    4. Abnormal heart rhythm      Past Medical History:   Diagnosis Date    Essential (primary) hypertension     Essential tremor     Seasonal allergies     Sleep apnea, unspecified      No past surgical history pertinent negatives on file.  Scheduled Meds:   amLODIPine  5 mg Oral Daily    aspirin  81 mg Oral Daily    docusate sodium  100 mg Oral BID    enoxparin  40 mg Subcutaneous Daily    famotidine (PF)  20 mg Intravenous Daily    folic acid  1 mg Oral Daily    levalbuterol  1.25 mg Nebulization Q8H    levoFLOXacin  750 mg Intravenous Once    lisinopriL  40 mg Oral Daily    metoprolol tartrate  12.5 mg Oral BID    mupirocin   Nasal BID    pravastatin  40 mg Oral QHS    sucralfate  1 g Oral QID (AC & HS)    tamsulosin  0.4 mg Oral Daily     Continuous Infusions:  PRN Meds:  Current Facility-Administered Medications:     0.9%  NaCl infusion (for blood administration), , Intravenous, Q24H PRN    0.9%  NaCl infusion (for blood administration), , Intravenous, Q24H PRN    0.9%  NaCl infusion (for blood administration), , Intravenous, Q24H PRN    acetaminophen, 650 mg, Oral, Q6H PRN    albumin human 5%, 12.5 g, Intravenous, PRN    dextrose 50%, 12.5 g, Intravenous, PRN    dextrose 50%, 12.5 g, Intravenous, PRN    dextrose 50%, 25 g, Intravenous, PRN    dextrose 50%, 25 g, Intravenous, PRN    glucagon (human recombinant), 1 mg, Intramuscular, PRN    glucose, 16 g, Oral, PRN    glucose, 24 g, Oral, PRN    HYDROcodone-acetaminophen, 1 tablet, Oral, Q4H PRN    insulin aspart U-100, 0-10 Units, Subcutaneous, QID (AC + HS) PRN    ketorolac, 30 mg, Intravenous, Q6H PRN    lactulose 10 gram/15 ml, 20 g, Oral, Q6H PRN    metoclopramide, 5 mg, Intravenous, Q6H PRN    ondansetron, 4 mg, Intravenous, Q4H PRN    vancomycin (VANCOCIN) 1,000 mg in  "D5W 250 mL IVPB (admixture device), 1,000 mg, Intravenous, On Call Procedure    Review of patient's allergies indicates:   Allergen Reactions    Penicillin Hives    Tuberculin, purified protein derivative Rash     There are no hospital problems to display for this patient.    Blood pressure 133/60, pulse 68, temperature 98.7 °F (37.1 °C), temperature source Oral, resp. rate 20, height 5' 7" (1.702 m), weight 99 kg (218 lb 4.8 oz), SpO2 96%.    Subjective:    POD #4  Awake. Alert.  Sitting up in chair  Ambulating with cardiac rehab    Objective:   AFVSS. 96% on RA. Wt up 4kg  Heart: RRR  Lungs: respirations nonlabored, clear  Incision: c/d/I  H/h: 9/27  Cr: 1.02  CT clamped  Cxr: Examination reveals cardiomediastinal silhouette and pleuroparenchymal changes to be essentially unchanged as compared with the previous exam     Assesment/Plan:    S/p CAB  - d/c left chest tube  - ambulate  - IS  - BK Granados  1/14/2025    "

## 2025-01-14 NOTE — PROGRESS NOTES
"[facility] *    Cardiology  Progress Note    Patient Name: Benito Montague III  MRN: [id number]  Admission Date: 1/8/2025  Hospital Length of Stay: 6 days  Code Status: Full Code   Attending Physician: Nirmal Cat MD   Primary Care Physician: Lew Sullivan MD  Expected Discharge Date:   Principal Problem:<principal problem not specified>    Subjective:     Brief HPI/Hospital Course: Mr. Montague is a 63 y/o male with a history of CAD, HTN, DM II, HLD, BRENNAN, who is known to [facility], Dr. Painting. He presented to the [facility] for a scheduled LHC after he was found to have an abnormal EKG and Stress Test. He underwent a LHC on 1.8.24 which revealed Multi-vessel CAD. He was then transferred to [facility] for a CABG Evaluation. No labs available to review. [facility] has been consulted for CAD management.     1.10.25: Intubated. VSS. SB on telemetry.  Plts 66   1.11.25: NAD. VSS. SB on telemetry. No complaints of SOB/Palps. Reports incisional chest pain. "I'm okay"  1.12.25: NAD. VSS. No complaints of Palps. Reports some incisional chest pain . Reports urinary retention. Fevers over night. Reports some shortness of breath early this morning was placed. "I feel okay today, just some incisional pain" H&H 10.3/32.6,   1.13.25: NAD. VSS. Sitting in chair. "I feel better today." Reports improvement in his SOB. Reports incisional chest pain and cough. He had a tasia fast ran yesterday due to stroke like symptoms.    1.14.25: NAD. VSS. No complaints of SOB/Palps. "I feel better. Still have come incisional chest pain" Cough on exam. WBC 11.58, H&H 9.1/27.0, Na 135     PMH: CAD, HTN, DM II, HLD, BRENNAN, Essential Tremor, Obesity  PSH: Colonoscopy, ACDF    Family History: Father - HTN, MI, CAD, HLD; Mother - HTN, MI, CAD, HLD, DM II; Sister - Cancer   Social History: Reports alcohol use. Denies illicit drug use and smoking.      Previous Cardiac Diagnostics:   CABG (1.10.25):  Coronary artery bypass grafting X 3 ,   Left " internal mammary artery to the LAD   Saphenous venous graft to   OM2  Saphenous venous graft to    PDA  Ligation OF FABY  Endoscopic venous harvesting left greater saphenous vein    ECHO (1.9.25):  Left Ventricle: The left ventricle is normal in size. Increased wall thickness. There is low normal systolic function with a visually estimated ejection fraction of 50 - 55%. There is normal diastolic function. Right Ventricle: Right ventricle was not well visualized due to poor acoustic window. Aortic Valve: There is no stenosis. There is no significant regurgitation. Mitral Valve: There is no significant regurgitation. Tricuspid Valve: There is trace regurgitation. IVC/SVC: Normal venous pressure at 3 mmHg. Pericardium: There is no pericardial effusion.    Carotid US (1.9.25):  The right internal carotid artery is patent with less than 50% stenosis.  The left internal carotid artery is patent with less than 50% stenosis.  Bilateral vertebral arteries are patent with antegrade flow.    LHC (1.8.25):  RCA:  of the mid RCA. The distal vessel fills via collaterals from the left. Left main: Severe distal LM disease 60-70% eccentric, calcified lesion. LCX: gives rise to 2 small and 2 large OM branches. Of the 2 larger branches the upper has 40-50% stenosis while the lower has a 60-70% stenosis. LAD: mild disease. Gives rise to 2 large diagonal branches.      LVEDP: 15 mmHg  Ao-LV Gradient: 0 mmHg     MPI (1.3.25):  This is an abnormal perfusion study. This scan is suggestive of moderate risk for future cardiovascular events. Medium reversible perfusion abnormality of moderate intensity in the inferior septal region. Medium reversible perfusion abnormality of moderate intensity in the inferior region. Medium reversible perfusion abnormality of moderate intensity in the inferior lateral region. The left ventricular cavity is noted to be normal on the stress study. The left ventricular ejection fraction was calculated to be  51% and left ventricular global function is normal. The study quality is good.     Review of Systems   Constitutional: Positive for fever.   Cardiovascular:  Positive for chest pain (Incisional). Negative for dyspnea on exertion, leg swelling and palpitations.   Respiratory:  Positive for cough. Negative for shortness of breath.    Genitourinary:         Urinary retention    All other systems reviewed and are negative.    Objective:     Vital Signs (Most Recent):  Temp: 98.1 °F (36.7 °C) (01/14/25 1126)  Pulse: 68 (01/14/25 1126)  Resp: 20 (01/14/25 0918)  BP: 124/68 (01/14/25 1126)  SpO2: 99 % (01/14/25 1126) Vital Signs (24h Range):  Temp:  [98.1 °F (36.7 °C)-99.2 °F (37.3 °C)] 98.1 °F (36.7 °C)  Pulse:  [67-81] 68  Resp:  [16-20] 20  SpO2:  [90 %-99 %] 99 %  BP: (116-141)/(57-72) 124/68   Weight: 99 kg (218 lb 4.8 oz)  Body mass index is 34.19 kg/m².  SpO2: 99 %       Intake/Output Summary (Last 24 hours) at 1/14/2025 1335  Last data filed at 1/13/2025 1616  Gross per 24 hour   Intake 720 ml   Output 90 ml   Net 630 ml     Lines/Drains/Airways       Peripheral Intravenous Line  Duration                  Peripheral IV - Single Lumen 20 G Left Antecubital -- days                    Significant Labs:   Chemistries:   Recent Labs   Lab 01/10/25  1646 01/11/25  0053 01/12/25  0812 01/13/25  0532 01/14/25  0516    139 132* 132* 135*   K 3.6 4.1 4.0 3.9 3.8   * 110* 103 99 101   CO2 21* 25 18* 23 26   BUN 10.1 9.7 16.0 20.2 19.0   CREATININE 0.90 0.89 1.02 1.08 1.02   CALCIUM 8.6* 8.0* 8.2* 8.0* 8.0*   BILITOT  --  1.1 1.1 1.1  --    ALKPHOS  --  35* 39* 41  --    ALT  --  17 18 13  --    AST  --  24 25 19  --    GLUCOSE 205* 143* 128* 113 120*   MG  --  1.90  --  2.10 2.20   PHOS  --  1.8*  --   --   --         CBC/Anemia Labs: Coags:    Recent Labs   Lab 01/12/25  0812 01/13/25  0532 01/14/25  0516   WBC 10.19 13.48* 11.58*   HGB 10.3* 9.2* 9.1*   HCT 32.6* 27.7* 27.0*   * 119* 141   MCV 95.0* 90.5  90.9   RDW 13.6 13.5 13.2    Recent Labs   Lab 01/09/25  1714 01/10/25  1343   INR 1.0 1.5*   APTT 29.2 30.7        Telemetry: SR    Physical Exam  Vitals and nursing note reviewed.   HENT:      Head: Normocephalic.      Mouth/Throat:      Mouth: Mucous membranes are dry.   Eyes:      Extraocular Movements: Extraocular movements intact.   Cardiovascular:      Rate and Rhythm: Normal rate and regular rhythm.      Pulses: Normal pulses.   Pulmonary:      Effort: Pulmonary effort is normal.      Comments: Crackles at bases.       Abdominal:      Palpations: Abdomen is soft.   Musculoskeletal:         General: Normal range of motion.      Cervical back: Normal range of motion.      Right lower leg: Edema present.      Left lower leg: Edema present.      Comments: CT x1   Skin:     General: Skin is warm.      Comments: Midline Incision C/D/I   Neurological:      Mental Status: He is alert and oriented to person, place, and time.   Psychiatric:         Mood and Affect: Mood normal.         Behavior: Behavior normal.       Current Schedule Inpatient Medications:   amLODIPine  5 mg Oral Daily    aspirin  81 mg Oral Daily    docusate sodium  100 mg Oral BID    enoxparin  40 mg Subcutaneous Daily    famotidine (PF)  20 mg Intravenous Daily    folic acid  1 mg Oral Daily    levalbuterol  1.25 mg Nebulization Q8H    lisinopriL  40 mg Oral Daily    metoprolol tartrate  12.5 mg Oral BID    mupirocin   Nasal BID    pravastatin  40 mg Oral QHS    sucralfate  1 g Oral QID (AC & HS)    tamsulosin  0.4 mg Oral Daily     Continuous Infusions:    Assessment:   CAD    - s/p CABG x3 (1.10.25): LIMA to LAD, SV to OM2, SV to PDA with Ligation of FABY    - LHC (1.8.25): severe multivessel CAD including distal left main and  of the RCA    - EF 50-55% on ECHO (1.9.25)  Acute Hypoxemic Respiratory Failure requiring Mechanical Ventilation - Currently on Room Air   Fever   Leukocytosis   AMS resolved   Hyponatremia   Bradycardia - Resolved    Urinary Retention   Thrombocytopenia - Worsening   Hypotension requiring Epi - Resolved     - History of HTN  DM II  HLD  BRENNAN  Essential Tremor  Obesity  No known history of GI Bleed    Plan:   Lasix 40 mg IV once Today   Continue ASA and Statin  Continue Lisinopril and Amlodipine   IV Antibiotics per  Team   CV surgery planning to remove chest tube today    Fever work-up in progress   Accurate I&O/Daily weight   Keep Mag > 2 and Potassium > 4  Labs: CBC, BMP, and Mag    MARY Prince  Cardiology  [facility]

## 2025-01-14 NOTE — CONSULTS
[facility] Hospital Medicine Progress Note        Chief Complaint: Inpatient Follow-up for S/P CABG and has fever     HPI:   Benito Montague III is a 64 y.o. male who had CAD and s/p CABG done on 1/10/25. Since 1/12/25 he started to develop a fever. Per nurse, last night he has some facial droop, code fast called and CT brain done. Symptoms resolved, seen by neruo team and advised MRI brain.   Patient also early this morning became SOB and hypoxic. Placed on o2 support and was stable. Per family patients mental status has been sluggish today.     Patent current awake and oriented x 3, following all verbal commands. Denies any fever or chills. He did have urinary retention and now has bustos draining dark colored urine. He denies any abdominal pain or diarrhea.       team consulted for sepsis work up. Blood cultures done, UA normal. He was started on iv levaquin.     Interval Hx:   Patient today awake and comfortable. Has been a afebrile for 2 days. Denies any cough or chest pain. Eating well.   Case was discussed with patient's nurse and  on the floor.    Objective/physical exam:  General: In no acute distress  Chest: Clear to auscultation bilaterally  Heart: RRR, +S1, S2, no appreciable murmur  Abdomen: Soft, nontender, BS +  Neurologic: Alert and oriented x4, Cranial nerve II-XII intact, Strength 5/5 in all 4 extremities    VITAL SIGNS: 24 HRS MIN & MAX LAST   Temp  Min: 97.5 °F (36.4 °C)  Max: 99.2 °F (37.3 °C) 98.7 °F (37.1 °C)   BP  Min: 116/57  Max: 141/67 133/60   Pulse  Min: 67  Max: 81  68   Resp  Min: 16  Max: 20 20   SpO2  Min: 90 %  Max: 96 % 96 %     I have reviewed the following labs:  Recent Labs   Lab 01/12/25  0812 01/13/25  0532 01/14/25  0516   WBC 10.19 13.48* 11.58*   RBC 3.43* 3.06* 2.97*   HGB 10.3* 9.2* 9.1*   HCT 32.6* 27.7* 27.0*   MCV 95.0* 90.5 90.9   MCH 30.0 30.1 30.6   MCHC 31.6* 33.2 33.7   RDW 13.6 13.5 13.2   * 119* 141   MPV  11.6* 12.0* 11.4*     Recent Labs   Lab 01/10/25  1605 01/10/25  1646 01/11/25  0053 01/11/25  0638 01/12/25  0651 01/12/25  0812 01/13/25  0532 01/14/25  0516   NA  --    < > 139  --   --  132* 132* 135*   K  --    < > 4.1  --   --  4.0 3.9 3.8   CL  --    < > 110*  --   --  103 99 101   CO2  --    < > 25  --   --  18* 23 26   BUN  --    < > 9.7  --   --  16.0 20.2 19.0   CREATININE  --    < > 0.89  --   --  1.02 1.08 1.02   CALCIUM  --    < > 8.0*  --   --  8.2* 8.0* 8.0*   PH 7.260*  --   --  7.460* 7.380  --   --   --    MG  --   --  1.90  --   --   --  2.10 2.20   ALBUMIN  --   --  3.4  --   --  3.6 3.3*  --    ALKPHOS  --   --  35*  --   --  39* 41  --    ALT  --   --  17  --   --  18 13  --    AST  --   --  24  --   --  25 19  --    BILITOT  --   --  1.1  --   --  1.1 1.1  --     < > = values in this interval not displayed.     Microbiology Results (last 7 days)       Procedure Component Value Units Date/Time    Blood Culture [[id number]]  (Normal) Collected: 01/12/25 1654    Order Status: Completed Specimen: Blood Updated: 01/13/25 1801     Blood Culture No Growth At 24 Hours    Blood Culture [[id number]]  (Normal) Collected: 01/12/25 1654    Order Status: Completed Specimen: Blood Updated: 01/13/25 1801     Blood Culture No Growth At 24 Hours    Respiratory Culture [[id number]]     Order Status: Sent Specimen: Sputum              See below for Radiology    Assessment/Plan:  Post Op day 2 fever   CAD s/p CABG  Urinary retention   Generalized weakness   HTN, benign   DM2  BRENNAN   Obesity     Plan:  Patient has no new issues  Afebrile x 2 days   Lungs clear   Will continue iv levaquin for today and stop  Sepsis work up so far negative   Resp PCR and MRSA PCR negative   Blood cultures negative     Continue incentive spirometry, ambulate as tolerated     Has akash for rentention, Rene bustos and continue flomax 0.4 mg po daily     We will be available as needed     VTE prophylaxis: Lovenox    Patient condition:   Fair        All diagnosis and differential diagnosis have been reviewed; assessment and plan has been documented; I have personally reviewed the labs and test results that are presently available; I have reviewed the patients medication list; I have reviewed the consulting providers response and recommendations. I have reviewed or attempted to review medical records based upon their availability    All of the patient's questions have been  addressed and answered. Patient's is agreeable to the above stated plan. I will continue to monitor closely and make adjustments to medical management as needed.    Portions of this note dictated using EMR integrated voice recognition software, and may be subject to voice recognition errors not corrected at proofreading. Please contact writer for clarification if needed.   _____________________________________________________________________    Malnutrition Status:  Nutrition consulted. Most recent weight and BMI monitored-     Measurements:  Wt Readings from Last 1 Encounters:   01/14/25 99 kg (218 lb 4.8 oz)   Body mass index is 34.19 kg/m².    Patient has been screened and assessed by RD.    Malnutrition Type:  Context:    Level:      Malnutrition Characteristic Summary:       Interventions/Recommendations (treatment strategy):        Scheduled Med:   amLODIPine  5 mg Oral Daily    aspirin  81 mg Oral Daily    docusate sodium  100 mg Oral BID    enoxparin  40 mg Subcutaneous Daily    famotidine (PF)  20 mg Intravenous Daily    folic acid  1 mg Oral Daily    levalbuterol  1.25 mg Nebulization Q8H    levoFLOXacin  750 mg Intravenous Once    lisinopriL  40 mg Oral Daily    metoprolol tartrate  12.5 mg Oral BID    mupirocin   Nasal BID    pravastatin  40 mg Oral QHS    sucralfate  1 g Oral QID (AC & HS)    tamsulosin  0.4 mg Oral Daily      Continuous Infusions:     PRN Meds:    Current Facility-Administered Medications:     0.9%  NaCl infusion (for blood administration), ,  Intravenous, Q24H PRN    0.9%  NaCl infusion (for blood administration), , Intravenous, Q24H PRN    0.9%  NaCl infusion (for blood administration), , Intravenous, Q24H PRN    acetaminophen, 650 mg, Oral, Q6H PRN    albumin human 5%, 12.5 g, Intravenous, PRN    dextrose 50%, 12.5 g, Intravenous, PRN    dextrose 50%, 12.5 g, Intravenous, PRN    dextrose 50%, 25 g, Intravenous, PRN    dextrose 50%, 25 g, Intravenous, PRN    glucagon (human recombinant), 1 mg, Intramuscular, PRN    glucose, 16 g, Oral, PRN    glucose, 24 g, Oral, PRN    HYDROcodone-acetaminophen, 1 tablet, Oral, Q4H PRN    insulin aspart U-100, 0-10 Units, Subcutaneous, QID (AC + HS) PRN    ketorolac, 30 mg, Intravenous, Q6H PRN    lactulose 10 gram/15 ml, 20 g, Oral, Q6H PRN    metoclopramide, 5 mg, Intravenous, Q6H PRN    ondansetron, 4 mg, Intravenous, Q4H PRN    vancomycin (VANCOCIN) 1,000 mg in D5W 250 mL IVPB (admixture device), 1,000 mg, Intravenous, On Call Procedure     Radiology:  I have personally reviewed the following imaging and agree with the radiologist.     X-Ray Chest 1 View  Narrative: EXAMINATION:  XR CHEST 1 VIEW    CLINICAL HISTORY:  chest pain;    TECHNIQUE:  Single frontal view of the chest was performed.    COMPARISON:  01/13/2025    FINDINGS:  There is some atelectasis versus infiltrate in the left lower lobe which is similar to the prior examination.  There appears to be a left-sided chest tube in place.  There is right lower lobe atelectasis versus developing infiltrate.  The heart is enlarged in size.  Postsurgical changes are seen the mediastinum.  Bones and joints show no acute abnormality.  Impression: Not significantly changed since prior    Electronically signed by: Samm Mccrary  Date:    01/13/2025  Time:    17:51  Electrophysiology Procedure  Procedure performed in the Invasive Lab    - See Procedure Log link below for nursing documentation    - See OpNote on Surgeries Tab for physician findings    - See Imaging Tab  for radiologist dictation  Cardiac catheterization  Procedure performed in the Invasive Lab    - See Procedure Log link below for nursing documentation    - See OpNote on Surgeries Tab for physician findings    - See Imaging Tab for radiologist dictation  X-Ray Chest AP Portable  Narrative: EXAMINATION:  XR CHEST AP PORTABLE    CLINICAL HISTORY:  Post-op;    TECHNIQUE:  Single frontal view of the chest was performed.    COMPARISON:  January 12, 2025    FINDINGS:  Examination reveals cardiomediastinal silhouette and pleuroparenchymal changes to be essentially unchanged as compared with the previous exam.    Support catheters remain in place  Impression: No significant change as compared with the previous exam.    Support catheters remain in place    Electronically signed by: Gwyn Oneal  Date:    01/13/2025  Time:    06:19  CV Ultrasound Bilateral Doppler Carotid  The right internal carotid artery is patent with less than 50% stenosis.  The left internal carotid artery is patent with less than 50% stenosis.  Bilateral vertebral arteries are patent with antegrade flow.      Fermín Canseco MD  Department of Hospital Medicine   [facility]   01/14/2025

## 2025-01-15 VITALS
HEART RATE: 67 BPM | HEIGHT: 67 IN | RESPIRATION RATE: 18 BRPM | SYSTOLIC BLOOD PRESSURE: 118 MMHG | WEIGHT: 214.31 LBS | DIASTOLIC BLOOD PRESSURE: 61 MMHG | OXYGEN SATURATION: 99 % | BODY MASS INDEX: 33.64 KG/M2 | TEMPERATURE: 98 F

## 2025-01-15 PROBLEM — I25.10 CAD (CORONARY ARTERY DISEASE): Status: ACTIVE | Noted: 2025-01-15

## 2025-01-15 LAB
ANION GAP SERPL CALC-SCNC: 8 MEQ/L
BASOPHILS # BLD AUTO: 0.05 X10(3)/MCL
BASOPHILS NFR BLD AUTO: 0.6 %
BUN SERPL-MCNC: 13.9 MG/DL (ref 8.4–25.7)
CALCIUM SERPL-MCNC: 8 MG/DL (ref 8.8–10)
CHLORIDE SERPL-SCNC: 101 MMOL/L (ref 98–107)
CO2 SERPL-SCNC: 26 MMOL/L (ref 23–31)
CREAT SERPL-MCNC: 0.91 MG/DL (ref 0.72–1.25)
CREAT/UREA NIT SERPL: 15
EOSINOPHIL # BLD AUTO: 0.33 X10(3)/MCL (ref 0–0.9)
EOSINOPHIL NFR BLD AUTO: 3.8 %
ERYTHROCYTE [DISTWIDTH] IN BLOOD BY AUTOMATED COUNT: 13.2 % (ref 11.5–17)
GFR SERPLBLD CREATININE-BSD FMLA CKD-EPI: >60 ML/MIN/1.73/M2
GLUCOSE SERPL-MCNC: 124 MG/DL (ref 82–115)
HCT VFR BLD AUTO: 26.4 % (ref 42–52)
HGB BLD-MCNC: 8.8 G/DL (ref 14–18)
IMM GRANULOCYTES # BLD AUTO: 0.04 X10(3)/MCL (ref 0–0.04)
IMM GRANULOCYTES NFR BLD AUTO: 0.5 %
LYMPHOCYTES # BLD AUTO: 1.36 X10(3)/MCL (ref 0.6–4.6)
LYMPHOCYTES NFR BLD AUTO: 15.6 %
MAGNESIUM SERPL-MCNC: 2.1 MG/DL (ref 1.6–2.6)
MCH RBC QN AUTO: 30.2 PG (ref 27–31)
MCHC RBC AUTO-ENTMCNC: 33.3 G/DL (ref 33–36)
MCV RBC AUTO: 90.7 FL (ref 80–94)
MONOCYTES # BLD AUTO: 0.98 X10(3)/MCL (ref 0.1–1.3)
MONOCYTES NFR BLD AUTO: 11.3 %
NEUTROPHILS # BLD AUTO: 5.94 X10(3)/MCL (ref 2.1–9.2)
NEUTROPHILS NFR BLD AUTO: 68.2 %
NRBC BLD AUTO-RTO: 0 %
OHS QRS DURATION: 82 MS
OHS QTC CALCULATION: 509 MS
PLATELET # BLD AUTO: 166 X10(3)/MCL (ref 130–400)
PMV BLD AUTO: 11 FL (ref 7.4–10.4)
POTASSIUM SERPL-SCNC: 3.5 MMOL/L (ref 3.5–5.1)
RBC # BLD AUTO: 2.91 X10(6)/MCL (ref 4.7–6.1)
SODIUM SERPL-SCNC: 135 MMOL/L (ref 136–145)
WBC # BLD AUTO: 8.7 X10(3)/MCL (ref 4.5–11.5)

## 2025-01-15 PROCEDURE — 94640 AIRWAY INHALATION TREATMENT: CPT

## 2025-01-15 PROCEDURE — 99900035 HC TECH TIME PER 15 MIN (STAT)

## 2025-01-15 PROCEDURE — 94760 N-INVAS EAR/PLS OXIMETRY 1: CPT

## 2025-01-15 PROCEDURE — 25000003 PHARM REV CODE 250: Performed by: PHYSICIAN ASSISTANT

## 2025-01-15 PROCEDURE — 25000003 PHARM REV CODE 250

## 2025-01-15 PROCEDURE — 99024 POSTOP FOLLOW-UP VISIT: CPT | Mod: ,,, | Performed by: PHYSICIAN ASSISTANT

## 2025-01-15 PROCEDURE — 99900031 HC PATIENT EDUCATION (STAT)

## 2025-01-15 PROCEDURE — 83735 ASSAY OF MAGNESIUM: CPT

## 2025-01-15 PROCEDURE — 25000242 PHARM REV CODE 250 ALT 637 W/ HCPCS: Performed by: INTERNAL MEDICINE

## 2025-01-15 PROCEDURE — 80048 BASIC METABOLIC PNL TOTAL CA: CPT

## 2025-01-15 PROCEDURE — 85025 COMPLETE CBC W/AUTO DIFF WBC: CPT

## 2025-01-15 PROCEDURE — 36415 COLL VENOUS BLD VENIPUNCTURE: CPT

## 2025-01-15 RX ORDER — AMLODIPINE BESYLATE 5 MG/1
5 TABLET ORAL DAILY
Qty: 90 TABLET | Refills: 3 | Status: SHIPPED | OUTPATIENT
Start: 2025-01-15 | End: 2026-01-15

## 2025-01-15 RX ORDER — TAMSULOSIN HYDROCHLORIDE 0.4 MG/1
0.4 CAPSULE ORAL DAILY
Qty: 30 CAPSULE | Refills: 11 | Status: SHIPPED | OUTPATIENT
Start: 2025-01-16 | End: 2026-01-16

## 2025-01-15 RX ORDER — LISINOPRIL 10 MG/1
40 TABLET ORAL DAILY
Qty: 360 TABLET | Refills: 3 | Status: SHIPPED | OUTPATIENT
Start: 2025-01-15 | End: 2025-01-15 | Stop reason: HOSPADM

## 2025-01-15 RX ORDER — FUROSEMIDE 20 MG/1
20 TABLET ORAL DAILY
Qty: 7 TABLET | Refills: 0 | Status: SHIPPED | OUTPATIENT
Start: 2025-01-15 | End: 2025-01-22

## 2025-01-15 RX ORDER — FAMOTIDINE 20 MG/1
20 TABLET, FILM COATED ORAL DAILY
Status: DISCONTINUED | OUTPATIENT
Start: 2025-01-16 | End: 2025-01-15 | Stop reason: HOSPADM

## 2025-01-15 RX ORDER — LISINOPRIL 40 MG/1
40 TABLET ORAL DAILY
Qty: 90 TABLET | Refills: 3 | Status: SHIPPED | OUTPATIENT
Start: 2025-01-16 | End: 2026-01-16

## 2025-01-15 RX ORDER — LISINOPRIL 10 MG/1
10 TABLET ORAL DAILY
Start: 2025-01-15 | End: 2025-01-15

## 2025-01-15 RX ORDER — ATORVASTATIN CALCIUM 40 MG/1
40 TABLET, FILM COATED ORAL DAILY
Qty: 90 TABLET | Refills: 3 | Status: SHIPPED | OUTPATIENT
Start: 2025-01-15 | End: 2026-01-15

## 2025-01-15 RX ADMIN — FAMOTIDINE 20 MG: 10 INJECTION, SOLUTION INTRAVENOUS at 09:01

## 2025-01-15 RX ADMIN — Medication 81 MG: at 09:01

## 2025-01-15 RX ADMIN — LEVALBUTEROL HYDROCHLORIDE 1.25 MG: 1.25 SOLUTION RESPIRATORY (INHALATION) at 07:01

## 2025-01-15 RX ADMIN — AMLODIPINE BESYLATE 5 MG: 5 TABLET ORAL at 09:01

## 2025-01-15 RX ADMIN — SUCRALFATE 1 G: 1 TABLET ORAL at 06:01

## 2025-01-15 RX ADMIN — MUPIROCIN: 20 OINTMENT TOPICAL at 09:01

## 2025-01-15 RX ADMIN — FOLIC ACID 1 MG: 1 TABLET ORAL at 09:01

## 2025-01-15 RX ADMIN — METOPROLOL TARTRATE 12.5 MG: 25 TABLET, FILM COATED ORAL at 09:01

## 2025-01-15 RX ADMIN — LISINOPRIL 40 MG: 20 TABLET ORAL at 09:01

## 2025-01-15 RX ADMIN — DOCUSATE SODIUM 100 MG: 100 CAPSULE, LIQUID FILLED ORAL at 09:01

## 2025-01-15 RX ADMIN — TAMSULOSIN HYDROCHLORIDE 0.4 MG: 0.4 CAPSULE ORAL at 09:01

## 2025-01-15 NOTE — PROGRESS NOTES
AVS packet given to patient and patient's family. Medication list, follow up appointment, and discharge instructions reviewed, all questions answered, patient voiced understanding. Patient's IV and heart monitor removed. Patient discharged home with family

## 2025-01-15 NOTE — PROGRESS NOTES
Inpatient Nutrition Evaluation    Admit Date: 1/8/2025   Total duration of encounter: 7 days   Patient Age: 64 y.o.    Nutrition Recommendation/Prescription     Recommend heart healthy diet on discharge.   Add Boost Glucose Control (provides 190 kcal, 16 g protein per serving) BID for additional nutrition.  Stool softeners for constipation.     Nutrition Assessment     Chart Review    Reason Seen: length of stay    Malnutrition Screening Tool Results   Have you recently lost weight without trying?: No  Have you been eating poorly because of a decreased appetite?: No   MST Score: 0   Diagnosis:  CAD    - s/p CABG x3 (1.10.25): LIMA to LAD, SV to OM2, SV to PDA with Ligation of FABY    - LHC (1.8.25): severe multivessel CAD including distal left main and  of the RCA    - EF 50-55% on ECHO (1.9.25)  Acute Hypoxemic Respiratory Failure requiring Mechanical Ventilation - Currently on Room Air   Fever   Leukocytosis   AMS resolved   Hyponatremia   Bradycardia - Resolved   Urinary Retention   Thrombocytopenia - Worsening   Hypotension requiring Epi - Resolved     - History of HTN  DM II  HLD  BRENNAN  Essential Tremor  Obesity  No known history of GI Bleed    Relevant Medical History:   Past Medical History:   Diagnosis Date    Essential (primary) hypertension     Essential tremor     Seasonal allergies     Sleep apnea, unspecified      Scheduled Medications:  amLODIPine, 5 mg, Daily  aspirin, 81 mg, Daily  docusate sodium, 100 mg, BID  enoxparin, 40 mg, Daily  [START ON 1/16/2025] famotidine, 20 mg, Daily  folic acid, 1 mg, Daily  levalbuterol, 1.25 mg, Q8H  lisinopriL, 40 mg, Daily  metoprolol tartrate, 12.5 mg, BID  mupirocin, , BID  pravastatin, 40 mg, QHS  sucralfate, 1 g, QID (AC & HS)  tamsulosin, 0.4 mg, Daily    Continuous Infusions:   PRN Medications:   Current Facility-Administered Medications:     0.9%  NaCl infusion (for blood administration), , Intravenous, Q24H PRN    0.9%  NaCl infusion (for blood  administration), , Intravenous, Q24H PRN    0.9%  NaCl infusion (for blood administration), , Intravenous, Q24H PRN    acetaminophen, 650 mg, Oral, Q6H PRN    albumin human 5%, 12.5 g, Intravenous, PRN    dextrose 50%, 12.5 g, Intravenous, PRN    dextrose 50%, 12.5 g, Intravenous, PRN    dextrose 50%, 25 g, Intravenous, PRN    dextrose 50%, 25 g, Intravenous, PRN    glucagon (human recombinant), 1 mg, Intramuscular, PRN    glucose, 16 g, Oral, PRN    glucose, 24 g, Oral, PRN    HYDROcodone-acetaminophen, 1 tablet, Oral, Q4H PRN    insulin aspart U-100, 0-10 Units, Subcutaneous, QID (AC + HS) PRN    ketorolac, 30 mg, Intravenous, Q6H PRN    lactulose 10 gram/15 ml, 20 g, Oral, Q6H PRN    metoclopramide, 5 mg, Intravenous, Q6H PRN    metoprolol, 5 mg, Intravenous, Q1H PRN    ondansetron, 4 mg, Intravenous, Q4H PRN    vancomycin (VANCOCIN) 1,000 mg in D5W 250 mL IVPB (admixture device), 1,000 mg, Intravenous, On Call Procedure    Recent Labs   Lab 01/09/25  0843 01/10/25  0423 01/10/25  1111 01/10/25  1343 01/10/25  1646 01/10/25  2351 01/11/25  0053 01/12/25  0812 01/13/25  0532 01/14/25  0516 01/15/25  0336    139  --  138 138  --  139 132* 132* 135* 135*   K 4.9 4.0  --  4.4 3.6  --  4.1 4.0 3.9 3.8 3.5   CALCIUM 9.3 8.8  --  10.1* 8.6*  --  8.0* 8.2* 8.0* 8.0* 8.0*   PHOS  --   --   --   --   --   --  1.8*  --   --   --   --    MG 2.20 2.30  --   --   --   --  1.90  --  2.10 2.20 2.10    107  --  110* 109*  --  110* 103 99 101 101   CO2 24 26  --  22* 21*  --  25 18* 23 26 26   BUN 11.6 12.8  --  9.9 10.1  --  9.7 16.0 20.2 19.0 13.9   CREATININE 1.14 0.97  --  0.83 0.90  --  0.89 1.02 1.08 1.02 0.91   EGFRNORACEVR >60 >60  --  >60 >60  --  >60 >60 >60 >60 >60   GLUCOSE 173* 97  --  163* 205*  --  143* 128* 113 120* 124*   BILITOT 0.7 0.6  --   --   --   --  1.1 1.1 1.1  --   --    ALKPHOS 43 41  --   --   --   --  35* 39* 41  --   --    ALT 20 18  --   --   --   --  17 18 13  --   --    AST 18 12  --    "--   --   --    --   --    ALBUMIN 4.3 4.1  --   --   --   --  3.4 3.6 3.3*  --   --    WBC 7.41 6.51  --  12.37* 11.50 10.94  --  10.19 13.48* 11.58* 8.70   HGB 15.1 14.5  --  11.6* 10.1* 11.3*  --  10.3* 9.2* 9.1* 8.8*   HCT 44.5 43.0   < > 34.0* 29.7* 34.2*  --  32.6* 27.7* 27.0* 26.4*    < > = values in this interval not displayed.     Nutrition Orders:  Diet diabetic 2000 Calories (up to 75 gm per meal)    Appetite/Oral Intake: fair/% of meals   Factors Affecting Nutritional Intake: decreased appetite  Food/Jain/Cultural Preferences: none reported  Food Allergies: none reported  Last Bowel Movement: 25  Wound(s):     Wound 25 1906 Pressure Injury Buttocks-Tissue loss description: Not applicable     Comments    1/15/25 Poor appetite last few days, but ate a good breakfast this morning. No nausea or vomiting. Receiving stool softeners for constipation, last BM 25. Willing to trial oral supplement if remains in the hospital, states possibly discharging today. UBW prior to admit ~211lbs.     Anthropometrics    Height: 5' 7" (170.2 cm), Height Method: Stated  Last Weight: 97.2 kg (214 lb 4.6 oz) (01/15/25 0506), Weight Method: Standard Scale  BMI (Calculated): 33.6  BMI Classification: obese grade I (BMI 30-34.9)        Ideal Body Weight (IBW), Male: 148 lb     % Ideal Body Weight, Male (lb): 142.26 %                 Usual Body Weight (UBW), k.9 kg  % Usual Body Weight: 101.57     Usual Weight Provided By: patient denies unintentional weight loss    Wt Readings from Last 5 Encounters:   01/15/25 97.2 kg (214 lb 4.6 oz)   21 103 kg (227 lb 1.2 oz)     Weight Change(s) Since Admission:   Wt Readings from Last 1 Encounters:   01/15/25 0506 97.2 kg (214 lb 4.6 oz)   25 0548 99 kg (218 lb 4.8 oz)   25 0511 101.7 kg (224 lb 3.3 oz)   25 0537 100.8 kg (222 lb 3.6 oz)   25 2322 95.5 kg (210 lb 8.6 oz)   25 2045 95.5 kg (210 lb 8.6 oz)   Admit " Weight: 95.5 kg (210 lb 8.6 oz) (01/08/25 2045), Weight Method: Standard Scale    Patient Education     Not applicable.    Nutrition Goals & Monitoring     Dietitian will monitor: food and beverage intake    Nutrition Risk/Follow-Up: low (follow-up in 5-7 days)  Patients assigned 'low nutrition risk' status do not qualify for a full nutritional assessment but will be monitored and re-evaluated in a 5-7 day time period. Please consult if re-evaluation needed sooner.

## 2025-01-15 NOTE — DISCHARGE SUMMARY
"[facility] *    Cardiology  Discharge Summary      Patient Name: Benito Montague III  MRN: [id number]  Admission Date: 1/8/2025  Hospital Length of Stay: 7 days  Discharge Date and Time:  01/15/2025 12:45 PM  Attending Physician: Nirmal Cat MD  Discharging Provider: MARY Prince  Primary Care Physician: Lew Sullivan MD    HPI/Hospital Course:   Mr. Montague is a 63 y/o male with a history of CAD, HTN, DM II, HLD, BRENNAN, who is known to [facility], Dr. Painting. [place] LHC after he was found to have an abnormal EKG and Stress Test. He underwent a LHC on 1.8.24 which revealed Multi-vessel CAD. He was then transferred to [facility] for a CABG Evaluation. No labs available to review. [facility] has been consulted for CAD management.      1.10.25: Intubated. VSS. SB on telemetry.  Plts 66   1.11.25: NAD. VSS. SB on telemetry. No complaints of SOB/Palps. Reports incisional chest pain. "I'm okay"  1.12.25: NAD. VSS. No complaints of Palps. Reports some incisional chest pain . Reports urinary retention. Fevers over night. Reports some shortness of breath early this morning was placed. "I feel okay today, just some incisional pain" H&H 10.3/32.6,   1.13.25: NAD. VSS. Sitting in chair. "I feel better today." Reports improvement in his SOB. Reports incisional chest pain and cough. He had a tasia fast ran yesterday due to stroke like symptoms.    1.14.25: NAD. VSS. No complaints of SOB/Palps. "I feel better. Still have come incisional chest pain" Cough on exam. WBC 11.58, H&H 9.1/27.0, Na 135  1.15.25: NAD. VSS. No complaints of CP/SOB/Palps. "I feel good. Ready to go home." H&H 8.8/26.4,      PMH: CAD, HTN, DM II, HLD, BRENNAN, Essential Tremor, Obesity  PSH: Colonoscopy, ACDF    Family History: Father - HTN, MI, CAD, HLD; Mother - HTN, MI, CAD, HLD, DM II; Sister - Cancer   Social History: Reports alcohol use. Denies illicit drug use and smoking.      Previous Cardiac " Diagnostics:   CABG (1.10.25):  Coronary artery bypass grafting X 3 ,   Left internal mammary artery to the LAD   Saphenous venous graft to   OM2  Saphenous venous graft to    PDA  Ligation OF FABY  Endoscopic venous harvesting left greater saphenous vein     ECHO (1.9.25):  Left Ventricle: The left ventricle is normal in size. Increased wall thickness. There is low normal systolic function with a visually estimated ejection fraction of 50 - 55%. There is normal diastolic function. Right Ventricle: Right ventricle was not well visualized due to poor acoustic window. Aortic Valve: There is no stenosis. There is no significant regurgitation. Mitral Valve: There is no significant regurgitation. Tricuspid Valve: There is trace regurgitation. IVC/SVC: Normal venous pressure at 3 mmHg. Pericardium: There is no pericardial effusion.     Carotid US (1.9.25):  The right internal carotid artery is patent with less than 50% stenosis.  The left internal carotid artery is patent with less than 50% stenosis.  Bilateral vertebral arteries are patent with antegrade flow.     LHC (1.8.25):  RCA:  of the mid RCA. The distal vessel fills via collaterals from the left. Left main: Severe distal LM disease 60-70% eccentric, calcified lesion. LCX: gives rise to 2 small and 2 large OM branches. Of the 2 larger branches the upper has 40-50% stenosis while the lower has a 60-70% stenosis. LAD: mild disease. Gives rise to 2 large diagonal branches.      LVEDP: 15 mmHg  Ao-LV Gradient: 0 mmHg     MPI (1.3.25):  This is an abnormal perfusion study. This scan is suggestive of moderate risk for future cardiovascular events. Medium reversible perfusion abnormality of moderate intensity in the inferior septal region. Medium reversible perfusion abnormality of moderate intensity in the inferior region. Medium reversible perfusion abnormality of moderate intensity in the inferior lateral region. The left ventricular cavity is noted to be normal on  the stress study. The left ventricular ejection fraction was calculated to be 51% and left ventricular global function is normal. The study quality is good.     Procedure(s) (LRB):  CORONARY ARTERY BYPASS GRAFT (CABG) (N/A)  SURGICAL PROCUREMENT, VEIN, ENDOSCOPIC (N/A)  EXCLUSION, LEFT ATRIAL APPENDAGE (N/A)   Consults:   Consults (From admission, onward)          Status Ordering Provider     Inpatient consult to Hospital Medicine  Once        Provider:  Fermín Canseco MD    Acknowledged COLETTE MODI     Inpatient consult to Pulmonology  Once        Provider:  Davon Wyatt MD    Acknowledged ELVIS MCGRATH     Inpatient consult to Cardiology  Once        Provider:  Nirmal Cat MD    Completed DAVY PEARL          Final Active Diagnoses:    Diagnosis Date Noted POA    PRINCIPAL PROBLEM:  CAD (coronary artery disease) [I25.10] 01/15/2025 Yes      Problems Resolved During this Admission:     Discharged Condition: stable  Review of Systems   Constitutional: Negative for malaise/fatigue.   Cardiovascular:  Negative for chest pain, dyspnea on exertion, leg swelling and palpitations.   Respiratory:  Negative for shortness of breath.    All other systems reviewed and are negative.    Physical Exam  Vitals and nursing note reviewed.   HENT:      Head: Normocephalic.      Nose: Nose normal.      Mouth/Throat:      Mouth: Mucous membranes are dry.   Eyes:      Extraocular Movements: Extraocular movements intact.   Cardiovascular:      Rate and Rhythm: Normal rate and regular rhythm.      Pulses: Normal pulses.      Heart sounds: Normal heart sounds.   Pulmonary:      Effort: Pulmonary effort is normal.      Breath sounds: Normal breath sounds.   Abdominal:      Palpations: Abdomen is soft.   Musculoskeletal:         General: Normal range of motion.      Cervical back: Normal range of motion.   Skin:     General: Skin is warm.      Comments: Midline Incision C/D/I   Neurological:      Mental Status:  He is alert and oriented to person, place, and time.   Psychiatric:         Mood and Affect: Mood normal.       Disposition: Home or Self Care  Follow Up:   Follow-up Information       Lew Sullivan MD. Go on 2/5/2025.    Specialty: Family Medicine  Why: @3 PM  Contact information:  [address] [facility]  [address]  [place] [address]  [phone]               Chadd Painting MD Follow up.    Specialty: Cardiology  Why: Hospital Follow-Up: 1/24 @8:50 AM     LabWork: 1/21 @10 AM  Contact information:  [address] [facility] [address]  [place] [address]  [phone]                           Patient Instructions:      Diet Cardiac     No driving until:     Lifting restrictions     Activity as tolerated     Medications:  Reconciled Home Medications:      Medication List        START taking these medications      aspirin 81 MG EC tablet  Commonly known as: ECOTRIN  Take 1 tablet (81 mg total) by mouth once daily.     atorvastatin 40 MG tablet  Commonly known as: LIPITOR  Take 1 tablet (40 mg total) by mouth once daily.     furosemide 20 MG tablet  Commonly known as: LASIX  Take 1 tablet (20 mg total) by mouth once daily. for 7 days     HYDROcodone-acetaminophen 5-325 mg per tablet  Commonly known as: NORCO  Take 1 tablet by mouth every 6 (six) hours as needed for Pain.     metoprolol tartrate 25 MG tablet  Commonly known as: LOPRESSOR  Take 0.5 tablets (12.5 mg total) by mouth 2 (two) times daily.     tamsulosin 0.4 mg Cap  Commonly known as: FLOMAX  Take 1 capsule (0.4 mg total) by mouth once daily.  Start taking on: January 16, 2025            CHANGE how you take these medications      lisinopriL 40 MG tablet  Commonly known as: PRINIVIL,ZESTRIL  Take 1 tablet (40 mg total) by mouth once daily.  Start taking on: January 16, 2025  What changed: medication strength            CONTINUE taking these medications      amLODIPine 5 MG tablet  Commonly known as: NORVASC  Take 1 tablet (5 mg total) by mouth once daily.      GLUCOSAMINE-CHONDROITIN ORAL  Take by mouth.     montelukast 10 mg tablet  Commonly known as: SINGULAIR  Take 10 mg by mouth.     pravastatin 40 MG tablet  Commonly known as: PRAVACHOL  Take 40 mg by mouth every evening.     RED YEAST RICE ORAL  Take 1,200 mg by mouth.     VITAMIN B-12 ORAL  0 Refill(s)            STOP taking these medications      propranoloL 10 MG tablet  Commonly known as: INDERAL            Impression:  CAD    - s/p CABG x3 (1.10.25): LIMA to LAD, SV to OM2, SV to PDA with Ligation of FABY    - LHC (1.8.25): severe multivessel CAD including distal left main and  of the RCA    - EF 50-55% on ECHO (1.9.25)  Acute Hypoxemic Respiratory Failure requiring Mechanical Ventilation - Currently on Room Air   Fever   Leukocytosis   AMS resolved   Hyponatremia   Bradycardia - Resolved   Urinary Retention - Resolved  Thrombocytopenia - Resolved  Hypotension requiring Epi - Resolved     - History of HTN  DM II  HLD  BRENNAN  Essential Tremor  Obesity  No known history of GI Bleed    Plan:   Discharge Home   Lasix 20 mg oral Daily for 1 week  Current Current Medications  Sternal Precautions  Change Pravastatin to Atorvastatin 40 mg oral Daily (High Intensity statin)  Follow-up with Dr. Painting in 1-2 weeks and CMP in 1 week        Time spent on the discharge of patient: 33 minutes    MARY Prince  Cardiology  [facility]  Physician addendum:  I have seen and examined this patient as a split-shared visit with the MARCELO d/t complicated medical management of above problems written in assessment and high acuity requiring physician expertise in medical decision-making. I performed the substantive portion of the history and exam. Above medical decision-making is also formulated by me.    Pt. Would like to be discharged.   Plan:   Medications reviewed. Discharge to home. Answered all questions prior to discharge.   F/U scheduled as above.     Kameron Zendejas MD  Cardiologist

## 2025-01-15 NOTE — PROGRESS NOTES
CT SURGERY PROGRESS NOTE  Benito Montague III  64 y.o.  1960    Patients Procedure: Procedure(s) (LRB):  CORONARY ARTERY BYPASS GRAFT (CABG) (N/A)  SURGICAL PROCUREMENT, VEIN, ENDOSCOPIC (N/A)  EXCLUSION, LEFT ATRIAL APPENDAGE (N/A)    Subjective  Interval History:  POD 5  Op Note     Signed     Creation Time: 1/10/2025  2:26 PM       Preop diagnosis:  Severe coronary artery disease        Procedure:  Coronary artery bypass grafting X 3 ,   Left internal mammary artery to the LAD   Saphenous venous graft to   OM2  Saphenous venous graft to    PDA  Ligation OF FABY  Endoscopic venous harvesting left greater saphenous vein              Postop diagnosis:  The same as preop     Date: 1/10/2025     Anesthesia:  General     Blood loss: Per perfusion      Surgeon: Al Hallman MD     Assistant: Dana FLOREZ    Medication List  Infusions      Scheduled   amLODIPine  5 mg Oral Daily    aspirin  81 mg Oral Daily    docusate sodium  100 mg Oral BID    enoxparin  40 mg Subcutaneous Daily    [START ON 1/16/2025] famotidine  20 mg Oral Daily    folic acid  1 mg Oral Daily    levalbuterol  1.25 mg Nebulization Q8H    lisinopriL  40 mg Oral Daily    metoprolol tartrate  12.5 mg Oral BID    mupirocin   Nasal BID    pravastatin  40 mg Oral QHS    sucralfate  1 g Oral QID (AC & HS)    tamsulosin  0.4 mg Oral Daily       Objective:  Recent Vitals:  Temp:  [97.9 °F (36.6 °C)-99.1 °F (37.3 °C)] 98.3 °F (36.8 °C)  Pulse:  [] 67  Resp:  [11-18] 18  SpO2:  [95 %-100 %] 99 %  BP: (113-133)/(48-67) 118/61    Physical Exam     I/O last 24 hrs:  Intake/Output - Last 3 Shifts         01/13 0700  01/14 0659 01/14 0700  01/15 0659 01/15 0700 01/16 0659    P.O. 720 240     Total Intake(mL/kg) 720 (7.3) 240 (2.5)     Urine (mL/kg/hr) 0 (0)      Chest Tube 90      Total Output 90      Net +630 +240            Urine Occurrence 2 x 3 x     Stool Occurrence  0 x             Labs  BMP:   Recent Labs   Lab 01/15/25  0332    *   K 3.5      CO2 26   BUN 13.9   CREATININE 0.91   CALCIUM 8.0*   MG 2.10     CBC:   Recent Labs   Lab 01/15/25  0336   WBC 8.70   RBC 2.91*   HGB 8.8*   HCT 26.4*      MCV 90.7   MCH 30.2   MCHC 33.3     CMP:   Recent Labs   Lab 01/15/25  0336   CALCIUM 8.0*   *   K 3.5   CO2 26      BUN 13.9   CREATININE 0.91         Imaging:   CXR: X-Ray Chest AP Portable    Result Date: 1/13/2025  No significant change as compared with the previous exam. Support catheters remain in place Electronically signed by: Gwyn Oneal Date:    01/13/2025 Time:    06:19         ASSESSMENT    POD 5  No c/o  VSS  SR  CTA  DC planning  - home per [facility] today?    Case and plan of care discussed with MD Guy Lucas PA-C

## 2025-01-15 NOTE — PLAN OF CARE
01/15/25 1253   Final Note   Assessment Type Final Discharge Note   Anticipated Discharge Disposition Home-Health   Post-Acute Status   Post-Acute Authorization Home Health   Home Health Status Set-up Complete/Auth obtained   Discharge Delays None known at this time     Pt will dc to home with [facility].     DAINAS, tara info sent to

## 2025-01-15 NOTE — PROGRESS NOTES
Pharmacist Intervention IV to PO Note    Benito Montague III is a 64 y.o. male being treated with IV medication famotidine.    Patient Data:    Vital Signs (Most Recent):  Temp: 98.4 °F (36.9 °C) (01/15/25 0751)  Pulse: 77 (01/15/25 0800)  Resp: 18 (01/15/25 0728)  BP: (!) 119/48 (01/15/25 0751)  SpO2: 98 % (01/15/25 0751) Vital Signs (72h Range):  Temp:  [97.5 °F (36.4 °C)-101.5 °F (38.6 °C)]   Pulse:  []   Resp:  [11-22]   BP: ()/(48-95)   SpO2:  [90 %-100 %]      CBC:  Recent Labs   Lab 01/13/25  0532 01/14/25  0516 01/15/25  0336   WBC 13.48* 11.58* 8.70   RBC 3.06* 2.97* 2.91*   HGB 9.2* 9.1* 8.8*   HCT 27.7* 27.0* 26.4*   * 141 166   MCV 90.5 90.9 90.7   MCH 30.1 30.6 30.2   MCHC 33.2 33.7 33.3     CMP:     Recent Labs   Lab 01/11/25  0053 01/12/25  0812 01/13/25  0532 01/14/25  0516 01/15/25  0336   CALCIUM 8.0* 8.2* 8.0* 8.0* 8.0*   ALBUMIN 3.4 3.6 3.3*  --   --     132* 132* 135* 135*   K 4.1 4.0 3.9 3.8 3.5   CO2 25 18* 23 26 26   * 103 99 101 101   BUN 9.7 16.0 20.2 19.0 13.9   CREATININE 0.89 1.02 1.08 1.02 0.91   ALKPHOS 35* 39* 41  --   --    ALT 17 18 13  --   --    AST 24 25 19  --   --    BILITOT 1.1 1.1 1.1  --   --        Dietary Orders:  Diet Orders            Diet diabetic 2000 Calories (up to 75 gm per meal): Diabetic starting at 01/11 1629            Based on the following criteria, this patient qualifies for intravenous to oral conversion:  [x] The patients gastrointestinal tract is functioning (tolerating medications via oral or enteral route for 24 hours and tolerating food or enteral feeds for 24 hours).      IV Famotidine 20mg Daily will be changed to oral Famotidine 20mg Daily per [facility] protocol.    Pharmacist's Name: Alexsandra Quezada  Pharmacist's Extension: [phone]

## 2025-01-16 ENCOUNTER — PATIENT OUTREACH (OUTPATIENT)
Dept: ADMINISTRATIVE | Facility: CLINIC | Age: 65
End: 2025-01-16
Payer: MEDICARE

## 2025-01-16 PROCEDURE — G0180 MD CERTIFICATION HHA PATIENT: HCPCS | Mod: ,,, | Performed by: THORACIC SURGERY (CARDIOTHORACIC VASCULAR SURGERY)

## 2025-01-16 NOTE — PHYSICIAN QUERY
Due to conflicting clinical picture,  Please clarify the respiratory diagnosis.  No respiratory failure, maintained on vent for routine care or airway protection - purposely intubated for airway protection (e.g.: angioedema, stroke, trauma); without meeting the criteria for respiratory failure

## 2025-01-16 NOTE — PROGRESS NOTES
C3 nurse spoke with Benito Montague III for a TCC post hospital discharge follow up call. The patient has a scheduled [facility] appointment with Robert Damon MD on 2/3/25 @ 1:30, Chadd Painting MD (Cardiology); Hospital Follow-Up: 1/24 @8:50 AM  with LabWork: 1/21 @10 AM and Al Hallman MD (Cardiothoracic Surgery) on 2/12/2025; @9:10 AM.

## 2025-01-17 LAB
BACTERIA BLD CULT: NORMAL
BACTERIA BLD CULT: NORMAL

## 2025-01-27 ENCOUNTER — TELEPHONE (OUTPATIENT)
Dept: CARDIAC SURGERY | Facility: CLINIC | Age: 65
End: 2025-01-27
Payer: MEDICARE

## 2025-01-27 NOTE — TELEPHONE ENCOUNTER
Pt/spouse called to report increased drainage red, serous from CT site, s/p cabg 1/10/25, suture was removed by home health nurse, still has a piece of suture that needs to be removed. Pics sent and forwarded to our PA group.  Constant oozing, changing bandage 2-3 times day, no fever.  Home health going today to assess and will call with report.  Home health nurse called and other piece of suture removed, no redness or s/sx of infection, drainage has decreased serosang and they will go back on Wednesday to assess and if need be pt can come into office on Wednesday this week to see Dr. Hallman.

## 2025-01-29 ENCOUNTER — TELEPHONE (OUTPATIENT)
Dept: CARDIAC SURGERY | Facility: CLINIC | Age: 65
End: 2025-01-29
Payer: MEDICARE

## 2025-01-29 NOTE — TELEPHONE ENCOUNTER
Reported no drainage or s/sx of infection to ct site, no need for pt to come into office today for eval.  Inst to continue to monitor and call back to office with any further problems and we can get pt in office to eval.  Verb understanding.   ----- Message from Kayla sent at 1/29/2025  9:13 AM CST -----  Martine roberts/ [facility] called regardign pt went today to check drainage there was no drainage    Call back# [phone]

## 2025-01-31 NOTE — PHYSICIAN QUERY
Please clarify/provide the diagnosis associated with the clinical findings.  Encephalopathy, unspecified

## 2025-02-12 ENCOUNTER — OFFICE VISIT (OUTPATIENT)
Dept: CARDIAC SURGERY | Facility: CLINIC | Age: 65
End: 2025-02-12
Payer: MEDICARE

## 2025-02-12 VITALS
OXYGEN SATURATION: 99 % | SYSTOLIC BLOOD PRESSURE: 125 MMHG | HEART RATE: 57 BPM | BODY MASS INDEX: 32.33 KG/M2 | HEIGHT: 67 IN | WEIGHT: 206 LBS | DIASTOLIC BLOOD PRESSURE: 58 MMHG

## 2025-02-12 DIAGNOSIS — I25.10 CORONARY ARTERY DISEASE INVOLVING NATIVE HEART, UNSPECIFIED VESSEL OR LESION TYPE, UNSPECIFIED WHETHER ANGINA PRESENT: Primary | ICD-10-CM

## 2025-02-12 PROCEDURE — 99024 POSTOP FOLLOW-UP VISIT: CPT | Mod: POP,,, | Performed by: THORACIC SURGERY (CARDIOTHORACIC VASCULAR SURGERY)

## 2025-02-27 ENCOUNTER — EXTERNAL HOME HEALTH (OUTPATIENT)
Dept: HOME HEALTH SERVICES | Facility: HOSPITAL | Age: 65
End: 2025-02-27
Payer: MEDICARE

## 2025-03-16 ENCOUNTER — DOCUMENT SCAN (OUTPATIENT)
Dept: HOME HEALTH SERVICES | Facility: HOSPITAL | Age: 65
End: 2025-03-16
Payer: MEDICARE

## 2025-03-31 ENCOUNTER — DOCUMENT SCAN (OUTPATIENT)
Dept: HOME HEALTH SERVICES | Facility: HOSPITAL | Age: 65
End: 2025-03-31
Payer: MEDICARE

## (undated) DEVICE — SPONGE GAUZE 16PLY 4X4

## (undated) DEVICE — DRAIN CHEST DRY SUCTION

## (undated) DEVICE — CARTRIDGE HEPARIN DOSE

## (undated) DEVICE — NDL ASPIRATOR AIR 16G

## (undated) DEVICE — GLOVE COTTON KNITTED CUFF

## (undated) DEVICE — SUT 2 30IN SILK BLK BRAIDE

## (undated) DEVICE — CANNULA NON-VENT 24FR 14.5IN

## (undated) DEVICE — PACK SURG PERF CARDPULM BYPS

## (undated) DEVICE — CLAMP TOWEL EASY RELEASE TAB

## (undated) DEVICE — Device

## (undated) DEVICE — TRAY CATH FOL SIL TEMP 10 16FR

## (undated) DEVICE — HEMOCONCENTRATOR W TBNG ADPT

## (undated) DEVICE — TUBING INSUFFLATOR W/ROT CONCT

## (undated) DEVICE — APPLICATOR CHLORAPREP ORN 26ML

## (undated) DEVICE — BANDAGE ADHESIVE FABRIC 2X4

## (undated) DEVICE — STOPCOCK 4-WAY

## (undated) DEVICE — SYR IRRIGATION BULB STER 60ML

## (undated) DEVICE — SYR SLIP TIP 20CC

## (undated) DEVICE — CABLE PACING ALLGTR CLIP 12FT

## (undated) DEVICE — SUT PROLENE 4-0 RB-1 BL MO

## (undated) DEVICE — BAG MEDI-PLAST DECANTER C-FLOW

## (undated) DEVICE — SENSOR LOW LEVEL OXYGEN

## (undated) DEVICE — GLOVE PROTEXIS BLUE LATEX 7

## (undated) DEVICE — BOWL STERILE LG GRAD 32OZ

## (undated) DEVICE — DRESSING TEGADERM CHG 3.5X4.5

## (undated) DEVICE — HOLDER STRIP-T SELF ADH 2X10IN

## (undated) DEVICE — BLADE SURG STAINLESS STEEL #10

## (undated) DEVICE — SOL NORMAL USPCA 0.9%

## (undated) DEVICE — WIRE INTRAMYOCARDIAL TEMP

## (undated) DEVICE — CATH THOR STND RGHT ANG 28F

## (undated) DEVICE — GOWN ECLIPSE REINF LVL4 TWL XL

## (undated) DEVICE — SYR SLIP TIP 10ML SHIELD

## (undated) DEVICE — DRAPE SLUSH WARMER WITH DISC

## (undated) DEVICE — SPONGE LAP 18X18 PREWASHED

## (undated) DEVICE — INSERT INTRACK CLAMP ULT 66MM

## (undated) DEVICE — BLADE SCALP OPHTL BEVEL STR

## (undated) DEVICE — GLOVE BIOGEL 7.5

## (undated) DEVICE — HEMOSTAT SURGICEL FIBRLR 2X4IN

## (undated) DEVICE — CANNULA MC2 OVAL NVENT 32/40FR

## (undated) DEVICE — PENCIL ELECSURG ROCKER 15FT

## (undated) DEVICE — KIT VAVD

## (undated) DEVICE — SOL PLASMALYTE PH 7.4 1000ML

## (undated) DEVICE — CARTRIDGE HEPARIN 2 CHNNL ACT

## (undated) DEVICE — APPLIER LIGACLIP SM 9.38IN

## (undated) DEVICE — SUT PROLENE 7-0 BV175-6

## (undated) DEVICE — SOL ELECTROLYTE PH 7.4 500ML

## (undated) DEVICE — SYR 10CC LUER LOCK

## (undated) DEVICE — SUT ETHBND XTRA 1 OS-8 30IN

## (undated) DEVICE — SYS VIRTUOSAPH PLUS EVM

## (undated) DEVICE — GLOVE PROTEXIS HYDROGEL SZ6.5

## (undated) DEVICE — KIT SURGICAL TURNOVER

## (undated) DEVICE — SOL .9NACL PF 100 ML

## (undated) DEVICE — CATH THORACIC 28FR ST

## (undated) DEVICE — INSERT INTRACK CLAMP ULTRA 88M

## (undated) DEVICE — TUBE SUCTION 6.5 TIP 6FR

## (undated) DEVICE — SUT PROLENE 5-0 36IN C-1

## (undated) DEVICE — SOL LAC RINGERS 1000ML INJ

## (undated) DEVICE — DRESSING TELFA + BARR 4X6IN

## (undated) DEVICE — CARTRIDGE SILV 4CHAN 2.0-3.5MG

## (undated) DEVICE — KIT C.A.T.S. FAST START

## (undated) DEVICE — GLOVE PROTEXIS NEOPRN SZ8

## (undated) DEVICE — BLANKET HYPER ADULT 24X60IN

## (undated) DEVICE — SUT 2/0 36IN ETHIBOND EXCE

## (undated) DEVICE — LOOP STERION MAXI YEL 1X406MM

## (undated) DEVICE — SUT PROLENE 5/0 RB-1 36 IN

## (undated) DEVICE — SOL NACL IRR 3000ML

## (undated) DEVICE — PUNCH AORTIC ROT TIP 4.0MM

## (undated) DEVICE — PAD DEFIB CADENCE ADULT R2

## (undated) DEVICE — SUT MONOCRYL PLUS UD 3-0 27

## (undated) DEVICE — DRESSING TELFA + RECT 6X10IN

## (undated) DEVICE — GLOVE SURG BIOGEL LATEX SZ 7.5

## (undated) DEVICE — SUT SILK 2-0 BLK BR KS 30 I